# Patient Record
Sex: MALE | Race: WHITE | URBAN - METROPOLITAN AREA
[De-identification: names, ages, dates, MRNs, and addresses within clinical notes are randomized per-mention and may not be internally consistent; named-entity substitution may affect disease eponyms.]

---

## 2022-07-27 SDOH — HEALTH STABILITY: PHYSICAL HEALTH: ON AVERAGE, HOW MANY DAYS PER WEEK DO YOU ENGAGE IN MODERATE TO STRENUOUS EXERCISE (LIKE A BRISK WALK)?: 0 DAYS

## 2022-07-27 ASSESSMENT — SOCIAL DETERMINANTS OF HEALTH (SDOH)

## 2022-07-28 ENCOUNTER — OFFICE VISIT (OUTPATIENT)
Dept: FAMILY MEDICINE CLINIC | Age: 57
End: 2022-07-28
Payer: COMMERCIAL

## 2022-07-28 VITALS
DIASTOLIC BLOOD PRESSURE: 72 MMHG | SYSTOLIC BLOOD PRESSURE: 124 MMHG | WEIGHT: 249.4 LBS | RESPIRATION RATE: 16 BRPM | OXYGEN SATURATION: 95 % | HEART RATE: 83 BPM | HEIGHT: 70 IN | TEMPERATURE: 98.3 F | BODY MASS INDEX: 35.71 KG/M2

## 2022-07-28 DIAGNOSIS — Z23 NEED FOR SHINGLES VACCINE: ICD-10-CM

## 2022-07-28 DIAGNOSIS — Z71.82 EXERCISE COUNSELING: ICD-10-CM

## 2022-07-28 DIAGNOSIS — E11.42 DIABETIC POLYNEUROPATHY ASSOCIATED WITH TYPE 2 DIABETES MELLITUS (HCC): ICD-10-CM

## 2022-07-28 DIAGNOSIS — B35.1 ONYCHOMYCOSIS: ICD-10-CM

## 2022-07-28 DIAGNOSIS — M10.9 ACUTE GOUT OF LEFT HAND, UNSPECIFIED CAUSE: ICD-10-CM

## 2022-07-28 DIAGNOSIS — Z71.3 DIETARY COUNSELING: ICD-10-CM

## 2022-07-28 DIAGNOSIS — I10 PRIMARY HYPERTENSION: ICD-10-CM

## 2022-07-28 DIAGNOSIS — E78.5 HYPERLIPIDEMIA, UNSPECIFIED HYPERLIPIDEMIA TYPE: ICD-10-CM

## 2022-07-28 DIAGNOSIS — Z12.5 PROSTATE CANCER SCREENING: Primary | ICD-10-CM

## 2022-07-28 DIAGNOSIS — Z12.11 SCREEN FOR COLON CANCER: ICD-10-CM

## 2022-07-28 DIAGNOSIS — Z76.89 ESTABLISHING CARE WITH NEW DOCTOR, ENCOUNTER FOR: ICD-10-CM

## 2022-07-28 LAB — HBA1C MFR BLD: 7.7 %

## 2022-07-28 PROCEDURE — 99204 OFFICE O/P NEW MOD 45 MIN: CPT | Performed by: SURGERY

## 2022-07-28 PROCEDURE — 83036 HEMOGLOBIN GLYCOSYLATED A1C: CPT | Performed by: SURGERY

## 2022-07-28 PROCEDURE — 3051F HG A1C>EQUAL 7.0%<8.0%: CPT | Performed by: SURGERY

## 2022-07-28 RX ORDER — ALLOPURINOL 100 MG/1
100 TABLET ORAL DAILY
Qty: 7 TABLET | Refills: 0 | Status: SHIPPED | OUTPATIENT
Start: 2022-07-28

## 2022-07-28 RX ORDER — AMITRIPTYLINE HYDROCHLORIDE 10 MG/1
TABLET, FILM COATED ORAL
COMMUNITY
Start: 2022-07-14

## 2022-07-28 RX ORDER — ZOSTER VACCINE RECOMBINANT, ADJUVANTED 50 MCG/0.5
0.5 KIT INTRAMUSCULAR SEE ADMIN INSTRUCTIONS
Qty: 0.5 ML | Refills: 0 | Status: SHIPPED | OUTPATIENT
Start: 2022-07-28 | End: 2023-01-24

## 2022-07-28 SDOH — ECONOMIC STABILITY: FOOD INSECURITY: WITHIN THE PAST 12 MONTHS, YOU WORRIED THAT YOUR FOOD WOULD RUN OUT BEFORE YOU GOT MONEY TO BUY MORE.: NEVER TRUE

## 2022-07-28 SDOH — ECONOMIC STABILITY: FOOD INSECURITY: WITHIN THE PAST 12 MONTHS, THE FOOD YOU BOUGHT JUST DIDN'T LAST AND YOU DIDN'T HAVE MONEY TO GET MORE.: NEVER TRUE

## 2022-07-28 ASSESSMENT — LIFESTYLE VARIABLES
HOW MANY STANDARD DRINKS CONTAINING ALCOHOL DO YOU HAVE ON A TYPICAL DAY: 1 OR 2
HOW OFTEN DO YOU HAVE A DRINK CONTAINING ALCOHOL: 2-4 TIMES A MONTH

## 2022-07-28 ASSESSMENT — PATIENT HEALTH QUESTIONNAIRE - PHQ9
2. FEELING DOWN, DEPRESSED OR HOPELESS: 0
SUM OF ALL RESPONSES TO PHQ QUESTIONS 1-9: 0
SUM OF ALL RESPONSES TO PHQ QUESTIONS 1-9: 0
1. LITTLE INTEREST OR PLEASURE IN DOING THINGS: 0
SUM OF ALL RESPONSES TO PHQ9 QUESTIONS 1 & 2: 0
SUM OF ALL RESPONSES TO PHQ QUESTIONS 1-9: 0
SUM OF ALL RESPONSES TO PHQ QUESTIONS 1-9: 0

## 2022-07-28 ASSESSMENT — SOCIAL DETERMINANTS OF HEALTH (SDOH): HOW HARD IS IT FOR YOU TO PAY FOR THE VERY BASICS LIKE FOOD, HOUSING, MEDICAL CARE, AND HEATING?: NOT VERY HARD

## 2022-07-28 NOTE — PROGRESS NOTES
Yenny Grant. (:  1965) is a 59 y.o. male,New patient, here for evaluation of the following chief complaint(s):  Establish Care (Former Maxcine Salaam), Shoulder Pain (Right shoulder pain), and Health Maintenance (Due for Lipids, Covid Booster, Shingles, Colonoscopy, PSA, Diabetes Screen, Tdap, Hep C, HIV Screen)         ASSESSMENT/PLAN:  1. Prostate cancer screening  -     PSA Screening; Future  - Shared decision making. PEPITO is less effective than the PSA blood test in finding prostate cancer, but it can sometimes find cancers in men with normal PSA levels. Patient is understanding of this and risks versus benefits. PEPITO deferred. 2. Acute gout of left hand, unspecified cause  -     allopurinol (ZYLOPRIM) 100 MG tablet; Take 1 tablet by mouth in the morning., Disp-7 tablet, R-0Normal  -     Uric Acid; Future  - Back on allopurinol  - Uric acid to assess efficacy   3. Hyperlipidemia, unspecified hyperlipidemia type  -     Lipid, Fasting; Future  -Aggressive lifestyle modification  4. Primary hypertension  -     CBC with Auto Differential; Future  -     Comprehensive Metabolic Panel; Future  - Normotensive no changes. Stable. Needs to follow a sodium restricted diet  5. Need for shingles vaccine  -     zoster recombinant adjuvanted vaccine [facility]) 50 MCG/0.5ML SUSR injection; Inject 0.5 mLs into the muscle See Admin Instructions 1 dose now and repeat in 2-6 months, Disp-0.5 mL, R-0Normal  6. Screen for colon cancer  -     Fecal DNA Colorectal cancer screening (Cologuard)  - Shared decision making  7. Diabetic polyneuropathy associated with type 2 diabetes mellitus ([facility] [address]  -     POCT glycosylated hemoglobin (Hb A1C)  -     Amanda Soria DPM, Podiatry, [address]  8. Onychomycosis  -     ASHLEY Viramontes [address] [place] [address], Podiatry,  [facility] on dangers of oral antifungals  9. Dietary counseling  Counseled the patient on diet, continue to make positive choices.     It is important to focus on meeting food group needs with nutrient dense foods and stay within caloric limits. Nutrient dense foods will provide you with vitamins, minerals, and other health promoting nutrients. You should avoid added sugars, saturated fats, hydrogenated oils, and limit sodium intake (this isn't just table salt. .. turn the package over, read the label, stay under 2000 mg or 2g of total sodium daily). Track your calories, this will help you get an understanding of what a proper portion size is. Every day you should eat these:  -Veggies of all types  -Fruits  -Grains (strive for at least half of these to be whole-grain)  -Lean protein (poultry, fish, legumes, nuts)    Stick to the plate diet.    -56% of your dinner plate should be leafy green vegetables, dark green, red and orange vegetables. Do not use high-calorie dressing is a ranch or dressings that are filled with added sugars. 25% of your dinner plate should be whole grains. The remaining 25% of your dinner plate should be a lean protein. Limit your red meat intake to once per week and no more than 4 ounces in any serving.  -No need for second helpings. Limit or avoid these foods:  -Added sugars (less than 10% of your total calories in any given day should be from sugars)  -Saturated fats and hydrogenated oils (less than 10% of your total calories in any given day should be from these)  -Sodium (less than 2000 mg/day)  -Alcoholic beverages (even in moderation, alcohol can cause long-term health issues involving hypertension, electrolyte abnormalities, liver disease and even cancers of the mouth and throat)    Stay hydrated. Unless instructed otherwise by your physician, you should strive to drink at least eight 8 ounce glasses of water daily, some of these being fortified with electrolytes (such as a Pedialyte, or low calorie sports drink). Again, avoid added sugars.     10. Exercise counseling  Counseled the patient on importance of cardiovascular and resistance training. Make every attempt to engage in a level of activity, sustained for at least 30 minutes, where you saturate your undergarments with sweat. This should be done, at a minimum, three times per week. 6. Establishing care with new doctor, encounter for  All personal, family, social, surgical, medical history is reviewed and updated with patient. Allergies, medications updated. List of specialists follows with updated. DM/HM updated. Return in about 3 months (around 10/28/2022) for diabetic checkup . Subjective   SUBJECTIVE/OBJECTIVE:  HPI:    Dr. Howard PITTMAN [place], [place])    Gout:  -last flare about one year prior  -not taking allopurinol (was reportedly told this treats flares not prevents them)  -needs uric acid level  -needs purine restricted diet    DM2:  -jardiance  -glipizide  -metformin  -ozempic    -fasting BG is 160s    HTN:  -atenolol  -benazepril  -hctz  -norvasc      PSYCH:  -PHQ-9 Total Score: 0 (7/28/2022  8:46 AM)        Preventative:  Health Maintenance   Topic Date Due    Pneumococcal 0-64 years Vaccine (1 - PCV) Never done    Lipids  Never done    DTaP/Tdap/Td vaccine (1 - Tdap) Never done    Prostate Specific Antigen (PSA) Screening or Monitoring  Never done    Colorectal Cancer Screen  Never done    Shingles vaccine (1 of 2) Never done    COVID-19 Vaccine (4 - Booster for Pfizer series) 02/16/2022    Flu vaccine (1) 09/01/2022    A1C test (Diabetic or Prediabetic)  10/28/2022    Depression Screen  07/28/2023    Hepatitis A vaccine  Aged Out    Hepatitis B vaccine  Aged Out    Hib vaccine  Aged Out    Meningococcal (ACWY) vaccine  Aged Out    Hepatitis C screen  Discontinued    HIV screen  Discontinued           ROS:    Denies 10pt ROS other than noted in HPI.          Objective       PHYSICAL EXAM:    /72   Pulse 83   Temp 98.3 °F (36.8 °C) (Temporal)   Resp 16   Ht 5' 10\" (1.778 m)   Wt 249 lb 6.4 oz (113.1 kg)   SpO2 95%   BMI 35.79

## 2022-08-18 ENCOUNTER — HOSPITAL ENCOUNTER (OUTPATIENT)
Age: 57
Discharge: HOME | End: 2022-08-18
Payer: COMMERCIAL

## 2022-08-18 DIAGNOSIS — Z12.5 PROSTATE CANCER SCREENING: ICD-10-CM

## 2022-08-18 DIAGNOSIS — I10 PRIMARY HYPERTENSION: ICD-10-CM

## 2022-08-18 DIAGNOSIS — M10.9 ACUTE GOUT OF LEFT HAND, UNSPECIFIED CAUSE: ICD-10-CM

## 2022-08-18 DIAGNOSIS — E78.5 HYPERLIPIDEMIA, UNSPECIFIED HYPERLIPIDEMIA TYPE: ICD-10-CM

## 2022-08-18 LAB
ALBUMIN SERPL-MCNC: 4.6 G/DL (ref 3.5–5.2)
ALP BLD-CCNC: 94 U/L (ref 40–129)
ALT SERPL-CCNC: 41 U/L (ref 0–40)
ANION GAP SERPL CALCULATED.3IONS-SCNC: 13 MMOL/L (ref 7–16)
AST SERPL-CCNC: 27 U/L (ref 0–39)
BASOPHILS ABSOLUTE: 0.04 E9/L (ref 0–0.2)
BASOPHILS RELATIVE PERCENT: 0.5 % (ref 0–2)
BILIRUB SERPL-MCNC: 0.5 MG/DL (ref 0–1.2)
BUN BLDV-MCNC: 22 MG/DL (ref 6–20)
CALCIUM SERPL-MCNC: 9.9 MG/DL (ref 8.6–10.2)
CHLORIDE BLD-SCNC: 101 MMOL/L (ref 98–107)
CHOLESTEROL, FASTING: 136 MG/DL (ref 0–199)
CO2: 25 MMOL/L (ref 22–29)
CREAT SERPL-MCNC: 1.2 MG/DL (ref 0.7–1.2)
EOSINOPHILS ABSOLUTE: 0.25 E9/L (ref 0.05–0.5)
EOSINOPHILS RELATIVE PERCENT: 3.1 % (ref 0–6)
GFR AFRICAN AMERICAN: >60
GFR NON-AFRICAN AMERICAN: >60 ML/MIN/1.73
GLUCOSE BLD-MCNC: 204 MG/DL (ref 74–99)
HCT VFR BLD CALC: 45.3 % (ref 37–54)
HDLC SERPL-MCNC: 33 MG/DL
HEMOGLOBIN: 16.2 G/DL (ref 12.5–16.5)
IMMATURE GRANULOCYTES #: 0.03 E9/L
IMMATURE GRANULOCYTES %: 0.4 % (ref 0–5)
LDL CHOLESTEROL CALCULATED: 35 MG/DL (ref 0–99)
LYMPHOCYTES ABSOLUTE: 2.91 E9/L (ref 1.5–4)
LYMPHOCYTES RELATIVE PERCENT: 35.7 % (ref 20–42)
MCH RBC QN AUTO: 30.6 PG (ref 26–35)
MCHC RBC AUTO-ENTMCNC: 35.8 % (ref 32–34.5)
MCV RBC AUTO: 85.6 FL (ref 80–99.9)
MONOCYTES ABSOLUTE: 0.74 E9/L (ref 0.1–0.95)
MONOCYTES RELATIVE PERCENT: 9.1 % (ref 2–12)
NEUTROPHILS ABSOLUTE: 4.19 E9/L (ref 1.8–7.3)
NEUTROPHILS RELATIVE PERCENT: 51.2 % (ref 43–80)
PDW BLD-RTO: 12 FL (ref 11.5–15)
PLATELET # BLD: 220 E9/L (ref 130–450)
PMV BLD AUTO: 10.3 FL (ref 7–12)
POTASSIUM SERPL-SCNC: 4.2 MMOL/L (ref 3.5–5)
PROSTATE SPECIFIC ANTIGEN: 0.3 NG/ML (ref 0–4)
RBC # BLD: 5.29 E12/L (ref 3.8–5.8)
SODIUM BLD-SCNC: 139 MMOL/L (ref 132–146)
TOTAL PROTEIN: 8.1 G/DL (ref 6.4–8.3)
TRIGLYCERIDE, FASTING: 341 MG/DL (ref 0–149)
URIC ACID, SERUM: 6.7 MG/DL (ref 3.4–7)
VLDLC SERPL CALC-MCNC: 68 MG/DL
WBC # BLD: 8.2 E9/L (ref 4.5–11.5)

## 2022-08-18 PROCEDURE — 80053 COMPREHEN METABOLIC PANEL: CPT

## 2022-08-18 PROCEDURE — 36415 COLL VENOUS BLD VENIPUNCTURE: CPT

## 2022-08-18 PROCEDURE — 80061 LIPID PANEL: CPT

## 2022-08-18 PROCEDURE — 85025 COMPLETE CBC W/AUTO DIFF WBC: CPT

## 2022-08-18 PROCEDURE — G0103 PSA SCREENING: HCPCS

## 2022-08-18 PROCEDURE — 84550 ASSAY OF BLOOD/URIC ACID: CPT

## 2022-08-29 ENCOUNTER — PATIENT MESSAGE (OUTPATIENT)
Dept: FAMILY MEDICINE CLINIC | Age: 57
End: 2022-08-29

## 2022-08-29 DIAGNOSIS — E78.1 HYPERTRIGLYCERIDEMIA: Primary | ICD-10-CM

## 2022-08-29 RX ORDER — ICOSAPENT ETHYL 1000 MG/1
2 CAPSULE ORAL 2 TIMES DAILY
Qty: 60 CAPSULE | Refills: 3 | Status: SHIPPED
Start: 2022-08-29 | End: 2022-09-07 | Stop reason: SDUPTHER

## 2022-08-30 RX ORDER — OMEPRAZOLE 20 MG/1
CAPSULE, DELAYED RELEASE ORAL
Qty: 30 CAPSULE | Refills: 5 | Status: SHIPPED | OUTPATIENT
Start: 2022-08-30

## 2022-08-30 NOTE — TELEPHONE ENCOUNTER
From: Linda Kelley. To: Dr. Rbo Villeda: 8/29/2022 8:57 PM EDT  Subject: Medication    Can I get a prescription for omeprazole. My specialist says I should get it through you now.

## 2022-09-02 ENCOUNTER — TELEPHONE (OUTPATIENT)
Dept: FAMILY MEDICINE CLINIC | Age: 57
End: 2022-09-02

## 2022-09-02 NOTE — TELEPHONE ENCOUNTER
Phone call from pharmacy. They received a script on 8/29 for vascepa 2 capsules twice daily but quantity was #60. They want to know if they can get a new script for a 30 day supply instead of 15 days.

## 2022-09-06 DIAGNOSIS — E78.1 HYPERTRIGLYCERIDEMIA: ICD-10-CM

## 2022-09-06 NOTE — TELEPHONE ENCOUNTER
Per  \"Please rommel up thanks    120 caps    Thanks\"    Teed up prescription, pended for 's approval.

## 2022-09-07 ENCOUNTER — OFFICE VISIT (OUTPATIENT)
Dept: PODIATRY | Age: 57
End: 2022-09-07
Payer: COMMERCIAL

## 2022-09-07 VITALS — HEIGHT: 70 IN | WEIGHT: 250 LBS | BODY MASS INDEX: 35.79 KG/M2

## 2022-09-07 DIAGNOSIS — G60.9 IDIOPATHIC PERIPHERAL NEUROPATHY: ICD-10-CM

## 2022-09-07 DIAGNOSIS — B35.1 ONYCHOMYCOSIS: Primary | ICD-10-CM

## 2022-09-07 PROCEDURE — G8417 CALC BMI ABV UP PARAM F/U: HCPCS | Performed by: PODIATRIST

## 2022-09-07 PROCEDURE — G8427 DOCREV CUR MEDS BY ELIG CLIN: HCPCS | Performed by: PODIATRIST

## 2022-09-07 PROCEDURE — 99203 OFFICE O/P NEW LOW 30 MIN: CPT | Performed by: PODIATRIST

## 2022-09-07 RX ORDER — LEUCOVORIN/PYRIDOX/MECOBALAMIN 4-50-2 MG
4-50 TABLET ORAL 2 TIMES DAILY
Qty: 90 TABLET | Refills: 2 | Status: SHIPPED | OUTPATIENT
Start: 2022-09-07

## 2022-09-07 RX ORDER — ICOSAPENT ETHYL 1000 MG/1
2 CAPSULE ORAL 2 TIMES DAILY
Qty: 60 CAPSULE | Refills: 120 | Status: SHIPPED | OUTPATIENT
Start: 2022-09-07 | End: 2022-10-13 | Stop reason: SDUPTHER

## 2022-09-07 NOTE — PROGRESS NOTES
22     Joserialba Pavon. : 1965 Sex: male   Age: 59 y.o. Patient was referred by: William Curry DO  Patient's PCP/Provider is:  William Curry DO    Subjective:    Patient seen today for diabetic foot evaluation and management. Chief Complaint   Patient presents with    Nail Problem     Nail care       HPI: Patient stated he has had chronic neuropathy issues to both lower extremities for several years. Patient also having issues with nail dystrophy to his right great toe which she would like to discuss treatment options. Patient denies any additional issues at this time. He does try to wear appropriate shoe gear on a daily basis with ambulatory activities. ROS:  Const: Positives and pertinent negatives as per HPI. Musculo: Denies symptoms other than stated above. Neuro: Denies symptoms other than stated above. Skin: Denies symptoms other than stated above. Current Medications:    Current Outpatient Medications:     ciclopirox (PENLAC) 8 % solution, Apply topically daily to affected nails. , Disp: 6 mL, Rfl: 2    Folinic Acid-Vit B6-Vit B12 (FOLINIC-PLUS) 4-50-2 MG TABS, Take 4-50 mg by mouth 2 times daily, Disp: 90 tablet, Rfl: 2    omeprazole (PRILOSEC) 20 MG delayed release capsule, TAKE 1 CAPSULE BY MOUTH EVERY MORNING BEFORE BREAKFAST, Disp: 30 capsule, Rfl: 5    Icosapent Ethyl (VASCEPA) 1 g CAPS capsule, Take 2 capsules by mouth 2 times daily, Disp: 60 capsule, Rfl: 3    amitriptyline (ELAVIL) 10 MG tablet, , Disp: , Rfl:     empagliflozin (JARDIANCE) 10 MG tablet, Take 1 tablet by mouth in the morning., Disp: 30 tablet, Rfl: 3    allopurinol (ZYLOPRIM) 100 MG tablet, Take 1 tablet by mouth in the morning., Disp: 7 tablet, Rfl: 0    zoster recombinant adjuvanted vaccine (SHINGRIX) 50 MCG/0.5ML SUSR injection, Inject 0.5 mLs into the muscle See Admin Instructions 1 dose now and repeat in 2-6 months, Disp: 0.5 mL, Rfl: 0    amLODIPine (NORVASC) 10 MG tablet, TK 1 T PO QD, Disp: , Rfl:     sildenafil (VIAGRA) 100 MG tablet, 1 tab(s), Disp: , Rfl:     blood glucose test strips (ASCENSIA AUTODISC VI;ONE TOUCH ULTRA TEST VI) strip, 1, Disp: , Rfl:     Blood Glucose Monitoring Suppl (TRUE METRIX METER) [place], as directed, Disp: , Rfl:     gabapentin (NEURONTIN) 300 MG capsule, Take 300 mg by mouth in the morning and 300 mg at noon and 300 mg before bedtime.  Patient takes 600 mg am, 600 mg at noon, 900 mg at night., Disp: , Rfl:     aspirin 81 MG EC tablet, Take 81 mg by mouth daily, Disp: , Rfl:     vitamin D (ERGOCALCIFEROL) 1.25 MG (56882 UT) CAPS capsule, Take 50,000 Units by mouth once a week Thursday, Disp: , Rfl:     metFORMIN (GLUCOPHAGE) 1000 MG tablet, Take 1,000 mg by mouth 2 times daily (with meals), Disp: , Rfl:     atorvastatin (LIPITOR) 10 MG tablet, Take 10 mg by mouth daily, Disp: , Rfl:     glipiZIDE (GLUCOTROL) 10 MG tablet, Take 10 mg by mouth daily, Disp: , Rfl:     hydroCHLOROthiazide (HYDRODIURIL) 25 MG tablet, Take 25 mg by mouth daily, Disp: , Rfl:     benazepril (LOTENSIN) 40 MG tablet, Take 40 mg by mouth daily, Disp: , Rfl:     atenolol (TENORMIN) 100 MG tablet, Take 100 mg by mouth daily, Disp: , Rfl:     Semaglutide (OZEMPIC, 1 MG/DOSE, SC), Inject into the skin once a week, Disp: , Rfl:     vitamin B-12 (CYANOCOBALAMIN) 1000 MCG tablet, Take 1,000 mcg by mouth daily, Disp: , Rfl:     Multiple Vitamin (MULTIVITAMIN ADULT PO), Take by mouth daily, Disp: , Rfl:     Allergies:  No Known Allergies    Vitals:    09/07/22 1523   Weight: 250 lb (113.4 kg)   Height: 5' 10\" (1.778 m)        Past Medical History:   Diagnosis Date    DM2 (diabetes mellitus, type 2) (HCC)     High triglycerides     HTN (hypertension)     Neuropathy     feet    Sleep apnea     Vitamin D deficiency      Family History   Problem Relation Age of Onset    Crohn's Disease Mother     Pancreatic Cancer Father         dx age 77    Colon Cancer Neg Hx     Stomach Cancer Neg Hx     Liver Cancer Neg Hx     Esophageal Cancer Neg Hx     Ulcerative Colitis Neg Hx      Past Surgical History:   Procedure Laterality Date    CARPAL TUNNEL RELEASE      b/l    LUNG BIOPSY      ROTATOR CUFF REPAIR  2020    right    UPPER GASTROINTESTINAL ENDOSCOPY  08/12/2021    Dr. Alivia Miguel     Social History     Tobacco Use    Smoking status: Never    Smokeless tobacco: Never   Vaping Use    Vaping Use: Never used   Substance Use Topics    Alcohol use: Yes     Comment: occ    Drug use: Not Currently     Types: Marijuana Delona Members), Cocaine           Diagnostic studies:    No results found. Procedures:    None    Exam:  VASCULAR: Pedal pulses palpable bilateral foot. Capillary fill time less than 3 seconds digits 1 through 5 bilateral foot. Hair growth present bilateral lower extremities  NEUROLOGICAL: Epicritic sensations diminished to both lower extremities. Paresthesias noted to both lower extremities. DERMATOLOGICAL: Edema noted to both lower extremities with mild varicosities present. MUSCULOSKELETAL: Adequate range of motion ankle, subtalar joint, and MTPJ regions bilaterally. Plan Per Assessment  Suresh Iniguez was seen today for nail problem. Diagnoses and all orders for this visit:    Onychomycosis  -     ciclopirox (PENLAC) 8 % solution; Apply topically daily to affected nails. Idiopathic peripheral neuropathy  -     Folinic Acid-Vit B6-Vit B12 (FOLINIC-PLUS) 4-50-2 MG TABS; Take 4-50 mg by mouth 2 times daily        New patient consultation and management  We did discuss multiple treatment options in regards to issues today. Prescriptions given for topical Penlac solution as well as oral folinic plus to be utilized as instructed. We did discuss the importance of wearing good supportive shoe gear with every day activities. Patient will be followed up at a later date for continued evaluation and care.       Seen By:    Chantal Edwards DPM    Electronically signed by Chantal Edwards DPM on 9/7/2022 at 3:56 PM      This note was created using voice recognition software. The note was reviewed however may contain grammatical errors.

## 2022-09-07 NOTE — LETTER
[address] [facility] Juan Lopez  Phone: [phone]  Fax: Chapin Guzman, [address] [place] [address] Drive, [address] Evelyne Hurd.  [phone]   1965 9/7/2022      Dear Tello Clemens,    I would like to thank you for the kind referral of Pamela Saldivar. Georges Mcelroy He presented to the office today for evaluation regarding chronic neuropathy issues to both lower extremities. Patient also having issues in regards to chronic nail dystrophy right great toe. We did discuss treatment options in regards to both issues. Prescription's given for oral multivitamin as well as topical nail lacquer to be utilized as instructed. We will have continued follow-up until issues are improved. If you should have any questions concerning his visit today, please do not hesitate to contact me.     Sincerely,    Anton Monahan DPM

## 2022-09-07 NOTE — PROGRESS NOTES
Patient is in today for routine nail care. Patient also believes he has nail fungus.  Pcp is Wellington Vo DO  Last ov 7/28/22

## 2022-09-24 LAB — NONINV COLON CA DNA+OCC BLD SCRN STL QL: POSITIVE

## 2022-09-28 ENCOUNTER — TELEPHONE (OUTPATIENT)
Dept: FAMILY MEDICINE CLINIC | Age: 57
End: 2022-09-28

## 2022-09-28 DIAGNOSIS — R19.5 POSITIVE COLORECTAL CANCER SCREENING USING COLOGUARD TEST: Primary | ICD-10-CM

## 2022-10-05 ENCOUNTER — OFFICE VISIT (OUTPATIENT)
Dept: PODIATRY | Age: 57
End: 2022-10-05
Payer: COMMERCIAL

## 2022-10-05 VITALS — BODY MASS INDEX: 35.79 KG/M2 | HEIGHT: 70 IN | WEIGHT: 250 LBS

## 2022-10-05 DIAGNOSIS — B35.1 ONYCHOMYCOSIS: Primary | ICD-10-CM

## 2022-10-05 DIAGNOSIS — G60.9 IDIOPATHIC PERIPHERAL NEUROPATHY: ICD-10-CM

## 2022-10-05 PROCEDURE — G8484 FLU IMMUNIZE NO ADMIN: HCPCS | Performed by: PODIATRIST

## 2022-10-05 PROCEDURE — 3017F COLORECTAL CA SCREEN DOC REV: CPT | Performed by: PODIATRIST

## 2022-10-05 PROCEDURE — G8427 DOCREV CUR MEDS BY ELIG CLIN: HCPCS | Performed by: PODIATRIST

## 2022-10-05 PROCEDURE — G8417 CALC BMI ABV UP PARAM F/U: HCPCS | Performed by: PODIATRIST

## 2022-10-05 PROCEDURE — 1036F TOBACCO NON-USER: CPT | Performed by: PODIATRIST

## 2022-10-05 PROCEDURE — 99213 OFFICE O/P EST LOW 20 MIN: CPT | Performed by: PODIATRIST

## 2022-10-05 NOTE — PROGRESS NOTES
10/5/22     Jemima Grossman. : 1965   Sex: male    Age: 59 y.o. Patient's PCP/Provider is:  Tia Love DO    Subjective:  Patient is seen today for follow-up regarding continued care regarding nail fungal issues right foot. Patient is also taking the oral multivitamin in regards to the neuropathy issues as well. No other abnormalities noted at this time. Chief Complaint   Patient presents with    Nail Problem     Nail fungus        ROS:  Const: Positives and pertinent negatives as per HPI. Musculo: Denies symptoms other than stated above. Neuro: Denies symptoms other than stated above. Skin: Denies symptoms other than stated above. Current Medications:    Current Outpatient Medications:     Icosapent Ethyl (VASCEPA) 1 g CAPS capsule, Take 2 capsules by mouth 2 times daily, Disp: 60 capsule, Rfl: 120    ciclopirox (PENLAC) 8 % solution, Apply topically daily to affected nails. , Disp: 6 mL, Rfl: 2    Folinic Acid-Vit B6-Vit B12 (FOLINIC-PLUS) 4-50-2 MG TABS, Take 4-50 mg by mouth 2 times daily, Disp: 90 tablet, Rfl: 2    omeprazole (PRILOSEC) 20 MG delayed release capsule, TAKE 1 CAPSULE BY MOUTH EVERY MORNING BEFORE BREAKFAST, Disp: 30 capsule, Rfl: 5    amitriptyline (ELAVIL) 10 MG tablet, , Disp: , Rfl:     empagliflozin (JARDIANCE) 10 MG tablet, Take 1 tablet by mouth in the morning., Disp: 30 tablet, Rfl: 3    allopurinol (ZYLOPRIM) 100 MG tablet, Take 1 tablet by mouth in the morning., Disp: 7 tablet, Rfl: 0    zoster recombinant adjuvanted vaccine (SHINGRIX) 50 MCG/0.5ML SUSR injection, Inject 0.5 mLs into the muscle See Admin Instructions 1 dose now and repeat in 2-6 months, Disp: 0.5 mL, Rfl: 0    amLODIPine (NORVASC) 10 MG tablet, TK 1 T PO QD, Disp: , Rfl:     sildenafil (VIAGRA) 100 MG tablet, 1 tab(s), Disp: , Rfl:     blood glucose test strips (ASCENSIA AUTODISC VI;ONE TOUCH ULTRA TEST VI) strip, 1, Disp: , Rfl:     Blood Glucose Monitoring Suppl (TRUE METRIX METER) [place], as directed, Disp: , Rfl:     gabapentin (NEURONTIN) 300 MG capsule, Take 300 mg by mouth in the morning and 300 mg at noon and 300 mg before bedtime. Patient takes 600 mg am, 600 mg at noon, 900 mg at night., Disp: , Rfl:     aspirin 81 MG EC tablet, Take 81 mg by mouth daily, Disp: , Rfl:     vitamin D (ERGOCALCIFEROL) 1.25 MG (50277 UT) CAPS capsule, Take 50,000 Units by mouth once a week Thursday, Disp: , Rfl:     metFORMIN (GLUCOPHAGE) 1000 MG tablet, Take 1,000 mg by mouth 2 times daily (with meals), Disp: , Rfl:     atorvastatin (LIPITOR) 10 MG tablet, Take 10 mg by mouth daily, Disp: , Rfl:     glipiZIDE (GLUCOTROL) 10 MG tablet, Take 10 mg by mouth daily, Disp: , Rfl:     hydroCHLOROthiazide (HYDRODIURIL) 25 MG tablet, Take 25 mg by mouth daily, Disp: , Rfl:     benazepril (LOTENSIN) 40 MG tablet, Take 40 mg by mouth daily, Disp: , Rfl:     atenolol (TENORMIN) 100 MG tablet, Take 100 mg by mouth daily, Disp: , Rfl:     Semaglutide (OZEMPIC, 1 MG/DOSE, SC), Inject into the skin once a week, Disp: , Rfl:     vitamin B-12 (CYANOCOBALAMIN) 1000 MCG tablet, Take 1,000 mcg by mouth daily, Disp: , Rfl:     Multiple Vitamin (MULTIVITAMIN ADULT PO), Take by mouth daily, Disp: , Rfl:     Allergies:  No Known Allergies    Vitals:    10/05/22 0814   Weight: 250 lb (113.4 kg)   Height: 5' 10\" (1.778 m)       Exam:  Neurovascular status unchanged. Mycotic nail issues improved right great toe. No maceration webspaces noted right foot. Paresthesias are improved to both lower extremities with current treatment and use of compression stockings. Diagnostic Studies:     No results found. Procedures:    None    Plan Per Assessment  Riddhi Harvey was seen today for nail problem.     Diagnoses and all orders for this visit:    Onychomycosis    Idiopathic peripheral neuropathy      Evaluation and management  Did advise patient continued use of the topical nail lacquer to the affected digital nails for continued treatment. Patient is to continue with his compression stockings and the oral multivitamin to help with the underlying neuropathy issues. Patient will be followed up at a later date for continued evaluation and management. Seen By:    Maria Victoria Chance DPM    Electronically signed by Maria Victoria Chance DPM on 10/5/2022 at 8:36 AM    This note was created using voice recognition software. The note was reviewed however may contain grammatical errors.

## 2022-10-12 ENCOUNTER — TELEPHONE (OUTPATIENT)
Dept: FAMILY MEDICINE CLINIC | Age: 57
End: 2022-10-12

## 2022-10-12 RX ORDER — ATENOLOL 100 MG/1
100 TABLET ORAL DAILY
Qty: 90 TABLET | Refills: 3 | Status: SHIPPED | OUTPATIENT
Start: 2022-10-12

## 2022-10-12 RX ORDER — HYDROCHLOROTHIAZIDE 25 MG/1
25 TABLET ORAL DAILY
Qty: 90 TABLET | Refills: 3 | Status: SHIPPED | OUTPATIENT
Start: 2022-10-12

## 2022-10-12 RX ORDER — AMLODIPINE BESYLATE 10 MG/1
TABLET ORAL
Qty: 90 TABLET | Refills: 3 | Status: SHIPPED | OUTPATIENT
Start: 2022-10-12

## 2022-10-12 RX ORDER — BENAZEPRIL HYDROCHLORIDE 40 MG/1
40 TABLET, FILM COATED ORAL DAILY
Qty: 90 TABLET | Refills: 3 | Status: SHIPPED | OUTPATIENT
Start: 2022-10-12

## 2022-10-12 NOTE — TELEPHONE ENCOUNTER
[facility] called stating patient's insurance is requiring a 90 day supply of Vascepa and asked if Dr. Zully De Santiago can update RX and resubmit for quantity #360.     Last seen 7/28/2022  Next appt 11/10/2022  [facility]

## 2022-10-12 NOTE — TELEPHONE ENCOUNTER
Patient requesting medication refill.     Last Appointment   Visit date not found  Next Appointment  11/10/2022

## 2022-10-13 DIAGNOSIS — E78.1 HYPERTRIGLYCERIDEMIA: ICD-10-CM

## 2022-10-13 RX ORDER — ICOSAPENT ETHYL 1000 MG/1
2 CAPSULE ORAL 2 TIMES DAILY
Qty: 360 CAPSULE | Refills: 2 | Status: SHIPPED | OUTPATIENT
Start: 2022-10-13

## 2022-10-14 ENCOUNTER — TELEPHONE (OUTPATIENT)
Dept: FAMILY MEDICINE CLINIC | Age: 57
End: 2022-10-14

## 2022-10-14 DIAGNOSIS — U07.1 COVID-19: Primary | ICD-10-CM

## 2022-10-14 RX ORDER — NIRMATRELVIR AND RITONAVIR 300-100 MG
KIT ORAL
Qty: 30 TABLET | Refills: 0 | Status: SHIPPED | OUTPATIENT
Start: 2022-10-14 | End: 2022-10-19

## 2022-10-14 NOTE — TELEPHONE ENCOUNTER
I was speaking w/patient regarding an RX that was sent in and patient informed he tested positive for COVID yesterday. Patient stated he's a little stuffed up, coughing and having some dizziness. I advised patient to stay hydrated and get plenty of rest.  Informed patient that I would let Dr. Mary Kate Hughes know.     Last seen 7/28/2022  Next appt 11/10/2022  [facility]

## 2022-10-24 ENCOUNTER — PATIENT MESSAGE (OUTPATIENT)
Dept: FAMILY MEDICINE CLINIC | Age: 57
End: 2022-10-24

## 2022-10-25 NOTE — TELEPHONE ENCOUNTER
From: Brian Multani. To: Dr. Lennox Michelle: 10/24/2022 5:54 PM EDT  Subject: New prescriptions for current medications    Could I please get a new prescription for Glipizide 10mg tablets. 1 tablet a day. Vitamin D2 50,000IU 1 time a week. Can I get these for 90 day supplies for insurance purposes. Thank you.

## 2022-10-26 RX ORDER — ERGOCALCIFEROL 1.25 MG/1
50000 CAPSULE ORAL WEEKLY
Qty: 12 CAPSULE | Refills: 0 | Status: SHIPPED | OUTPATIENT
Start: 2022-10-26

## 2022-10-26 RX ORDER — GLIPIZIDE 10 MG/1
10 TABLET ORAL DAILY
Qty: 90 TABLET | Refills: 0 | Status: SHIPPED | OUTPATIENT
Start: 2022-10-26

## 2022-11-10 ENCOUNTER — OFFICE VISIT (OUTPATIENT)
Dept: FAMILY MEDICINE CLINIC | Age: 57
End: 2022-11-10
Payer: COMMERCIAL

## 2022-11-10 VITALS
RESPIRATION RATE: 16 BRPM | BODY MASS INDEX: 34.6 KG/M2 | SYSTOLIC BLOOD PRESSURE: 126 MMHG | TEMPERATURE: 97.7 F | DIASTOLIC BLOOD PRESSURE: 74 MMHG | HEIGHT: 70 IN | HEART RATE: 99 BPM | OXYGEN SATURATION: 98 % | WEIGHT: 241.7 LBS

## 2022-11-10 DIAGNOSIS — I10 PRIMARY HYPERTENSION: ICD-10-CM

## 2022-11-10 DIAGNOSIS — M10.9 ACUTE GOUT OF LEFT HAND, UNSPECIFIED CAUSE: ICD-10-CM

## 2022-11-10 DIAGNOSIS — E11.42 DIABETIC POLYNEUROPATHY ASSOCIATED WITH TYPE 2 DIABETES MELLITUS (HCC): Primary | ICD-10-CM

## 2022-11-10 LAB — HBA1C MFR BLD: 8.1 %

## 2022-11-10 PROCEDURE — 1036F TOBACCO NON-USER: CPT | Performed by: SURGERY

## 2022-11-10 PROCEDURE — 3078F DIAST BP <80 MM HG: CPT | Performed by: SURGERY

## 2022-11-10 PROCEDURE — 99214 OFFICE O/P EST MOD 30 MIN: CPT | Performed by: SURGERY

## 2022-11-10 PROCEDURE — G8427 DOCREV CUR MEDS BY ELIG CLIN: HCPCS | Performed by: SURGERY

## 2022-11-10 PROCEDURE — 3074F SYST BP LT 130 MM HG: CPT | Performed by: SURGERY

## 2022-11-10 PROCEDURE — 90677 PCV20 VACCINE IM: CPT | Performed by: SURGERY

## 2022-11-10 PROCEDURE — 3052F HG A1C>EQUAL 8.0%<EQUAL 9.0%: CPT | Performed by: SURGERY

## 2022-11-10 PROCEDURE — 90472 IMMUNIZATION ADMIN EACH ADD: CPT | Performed by: SURGERY

## 2022-11-10 PROCEDURE — 3017F COLORECTAL CA SCREEN DOC REV: CPT | Performed by: SURGERY

## 2022-11-10 PROCEDURE — 2022F DILAT RTA XM EVC RTNOPTHY: CPT | Performed by: SURGERY

## 2022-11-10 PROCEDURE — 90471 IMMUNIZATION ADMIN: CPT | Performed by: SURGERY

## 2022-11-10 PROCEDURE — 83036 HEMOGLOBIN GLYCOSYLATED A1C: CPT | Performed by: SURGERY

## 2022-11-10 PROCEDURE — G8417 CALC BMI ABV UP PARAM F/U: HCPCS | Performed by: SURGERY

## 2022-11-10 PROCEDURE — 90674 CCIIV4 VAC NO PRSV 0.5 ML IM: CPT | Performed by: SURGERY

## 2022-11-10 PROCEDURE — G8482 FLU IMMUNIZE ORDER/ADMIN: HCPCS | Performed by: SURGERY

## 2022-11-10 RX ORDER — GLUCOSAMINE HCL/CHONDROITIN SU 500-400 MG
CAPSULE ORAL
Qty: 100 STRIP | Refills: 0 | Status: SHIPPED
Start: 2022-11-10 | End: 2022-12-01

## 2022-11-10 RX ORDER — POLYETHYLENE GLYCOL 3350 17 G/17G
POWDER, FOR SOLUTION ORAL
COMMUNITY
Start: 2022-11-07

## 2022-11-10 RX ORDER — GABAPENTIN 600 MG/1
TABLET ORAL
COMMUNITY
Start: 2022-10-26

## 2022-11-10 RX ORDER — GABAPENTIN 300 MG/1
300 CAPSULE ORAL 3 TIMES DAILY
Qty: 90 CAPSULE | Refills: 1 | Status: SHIPPED | OUTPATIENT
Start: 2022-11-10 | End: 2023-02-08

## 2022-11-10 RX ORDER — LANCETS 30 GAUGE
1 EACH MISCELLANEOUS 2 TIMES DAILY
Qty: 100 EACH | Refills: 5 | Status: SHIPPED | OUTPATIENT
Start: 2022-11-10

## 2022-11-10 RX ORDER — ALLOPURINOL 100 MG/1
100 TABLET ORAL DAILY
Qty: 90 TABLET | Refills: 1 | Status: SHIPPED | OUTPATIENT
Start: 2022-11-10

## 2022-11-10 RX ORDER — BLOOD-GLUCOSE METER
1 KIT MISCELLANEOUS DAILY
Qty: 1 KIT | Refills: 0 | Status: SHIPPED | OUTPATIENT
Start: 2022-11-10

## 2022-11-10 NOTE — PROGRESS NOTES
[address] (:  1965) is a 59 y.o. male,Established patient, here for evaluation of the following chief complaint(s):  Diabetes (3 MONTH FOLLOW UP) and Health Maintenance (PNEUMOCOCCAL, DIABETIC EYE, TDAP, SHINGLES, COVID BOOSTER)         ASSESSMENT/PLAN:  1. Diabetic polyneuropathy associated with type 2 diabetes mellitus (HCC)  -     POCT glycosylated hemoglobin (Hb A1C)  -     glucose monitoring (FREESTYLE FREEDOM) kit; DAILY Starting Thu 11/10/2022, Disp-1 kit, R-0, Normal  -     blood glucose monitor strips; Test bid times a day & as needed for symptoms of irregular blood glucose. Dispense sufficient amount for indicated testing frequency plus additional to accommodate PRN testing needs. , Disp-100 strip, R-0, Normal  -     Lancets MISC; 2 TIMES DAILY Starting Thu 11/10/2022, Disp-100 each, R-5, Normal  -     gabapentin (NEURONTIN) 300 MG capsule; Take 1 capsule by mouth 3 times daily for 90 days. Patient takes 600 mg am, 600 mg at noon, 900 mg at night, Disp-90 capsule, R-1Normal    Extensive dietary counseling again today  He will track carbs and limit as directed in AVS  Check fasting bg daily, keep log, and do this prn for symptoms of hyper/hypoglycemia  Will increase ozempic for now to 2mg  2. Acute gout of left hand, unspecified cause  -     allopurinol (ZYLOPRIM) 100 MG tablet; Take 1 tablet by mouth daily, Disp-90 tablet, R-1Normal  No flare  Uric acid wnl  3. Primary hypertension  Stable  Normotensive  No changes  Lytes balanced    Return in about 3 months (around 2/10/2023) for diabetes .          Subjective   SUBJECTIVE/OBJECTIVE:  HPI:    Gout:  -last flare about one year prior  -not taking allopurinol (was reportedly told this treats flares not prevents them)  -uric acid was 6.7  -needs purine restricted diet       DM2:  -jardiance 10mg daily (no nausea, diarrheal illness knows to not take if this occurs, no perineal symptoms)  -glipizide 10mg daily with breakfast (no hypoglycemic episodes)  -metformin 1000mg with meal (no gi symptoms)  -ozempic 1mg weekly (no nausea, epigastric pain)     -fasting BG is 180s but does not routinely check    -eye exams with: \"no idea. .. [address] [facility] in [place]\"  -Dr. Kelli Hughes following with podiatry     -A1c last visit: 7.7%  -A1c today: 8.1%       HTN:  -atenolol  -benazepril  -hctz  -norvasc    -no untoward effects  -no alarm s/s    Preventative:  Health Maintenance   Topic Date Due    Diabetic foot exam  Never done    Diabetic microalbuminuria test  Never done    Diabetic retinal exam  Never done    Hepatitis B vaccine (1 of 3 - Risk 3-dose series) Never done    DTaP/Tdap/Td vaccine (1 - Tdap) Never done    Shingles vaccine (1 of 2) Never done    COVID-19 Vaccine (4 - Booster for Pfizer series) 12/11/2021    Depression Screen  07/28/2023    Lipids  08/18/2023    A1C test (Diabetic or Prediabetic)  11/10/2023    Colorectal Cancer Screen  09/20/2025    Flu vaccine  Completed    Pneumococcal 0-64 years Vaccine  Completed    Hepatitis A vaccine  Aged Out    Hib vaccine  Aged Out    Meningococcal (ACWY) vaccine  Aged Out    Hepatitis C screen  Discontinued    HIV screen  Discontinued           ROS:    Denies 10pt ROS other than noted in HPI. Objective       PHYSICAL EXAM:    /74   Pulse 99   Temp 97.7 °F (36.5 °C) (Temporal)   Resp 16   Ht 5' 10\" (1.778 m)   Wt 241 lb 11.2 oz (109.6 kg)   SpO2 98%   BMI 34.68 kg/m²     AVSS    GA: Well-groomed, appears well, no acute distress. HEENT: Atraumatic normocephalic. Extraocular muscles are grossly intact. Pupils are equal round reactive to light. Conjunctiva pink and moist.  Hearing is grossly intact. Nares patent without external drainage. Buccal mucosa pink and moist.  Posterior oropharynx clear without lesion or exudate. NECK: Trachea is midline, supple, nontender, no lymphadenopathy. CARDIO: Regular rate and rhythm without murmur rub or gallop. Cap refill 2+.   Radial pulses 2+ bilaterally. RESPIRATORY: Clear to auscultation bilaterally without wheezes rales or rhonchi. Normal inspiratory and expiratory effort. Normoxic on room air    ABD: Rounded, normoactive bowel sounds. Soft, nontender, no organomegaly. MSK: Structurally appropriate for age. No gross deficit. NEURO: Alert, no gross deficit. PSYCH:  Mood is normal and congruent with affect. No signs of psychomotor retardation or agitation. Thought content seems normal, speech is fluent and non-pressured. SKIN: Generally warm pink and dry. An electronic signature was used to authenticate this note.     --Babak Hendricks, DO

## 2022-12-01 DIAGNOSIS — E11.42 DIABETIC POLYNEUROPATHY ASSOCIATED WITH TYPE 2 DIABETES MELLITUS (HCC): ICD-10-CM

## 2022-12-01 RX ORDER — BLOOD-GLUCOSE METER
KIT MISCELLANEOUS
Qty: 100 STRIP | Refills: 0 | Status: SHIPPED | OUTPATIENT
Start: 2022-12-01

## 2022-12-06 ENCOUNTER — OFFICE VISIT (OUTPATIENT)
Dept: PODIATRY | Age: 57
End: 2022-12-06
Payer: COMMERCIAL

## 2022-12-06 VITALS — WEIGHT: 240 LBS | HEIGHT: 70 IN | BODY MASS INDEX: 34.36 KG/M2

## 2022-12-06 DIAGNOSIS — G60.9 IDIOPATHIC PERIPHERAL NEUROPATHY: ICD-10-CM

## 2022-12-06 DIAGNOSIS — B35.1 ONYCHOMYCOSIS: Primary | ICD-10-CM

## 2022-12-06 PROCEDURE — G8482 FLU IMMUNIZE ORDER/ADMIN: HCPCS | Performed by: PODIATRIST

## 2022-12-06 PROCEDURE — 3017F COLORECTAL CA SCREEN DOC REV: CPT | Performed by: PODIATRIST

## 2022-12-06 PROCEDURE — G8427 DOCREV CUR MEDS BY ELIG CLIN: HCPCS | Performed by: PODIATRIST

## 2022-12-06 PROCEDURE — 1036F TOBACCO NON-USER: CPT | Performed by: PODIATRIST

## 2022-12-06 PROCEDURE — 99213 OFFICE O/P EST LOW 20 MIN: CPT | Performed by: PODIATRIST

## 2022-12-06 PROCEDURE — G8417 CALC BMI ABV UP PARAM F/U: HCPCS | Performed by: PODIATRIST

## 2022-12-06 NOTE — PROGRESS NOTES
22     Sharee Hernandez. : 1965   Sex: male    Age: 59 y.o. Patient's PCP/Provider is:  James Mckeon DO    Subjective:  Patient is seen today for follow-up regarding mycotic nail issues noted right foot. Overall patient is doing well at this time and has been applying the topical medication as instructed. No other additional abnormalities noted at this time. Chief Complaint   Patient presents with    Nail Problem     Nail fungus        ROS:  Const: Positives and pertinent negatives as per HPI. Musculo: Denies symptoms other than stated above. Neuro: Denies symptoms other than stated above. Skin: Denies symptoms other than stated above. Current Medications:    Current Outpatient Medications:     blood glucose test strips (FREESTYLE LITE) strip, TEST BLOOD SUGAR TWICE DAILY AND AS NEEDED FOR SYMTOMS OF IRREGULAR BLOOD GLUCOSE, Disp: 100 strip, Rfl: 0    polyethylene glycol (GLYCOLAX) 17 GM/SCOOP powder, USE AS DIRECTED, Disp: , Rfl:     gabapentin (NEURONTIN) 600 MG tablet, TAKE 1 TABLET BY MOUTH IN THE MORNING 1 TABLET MIDDAY AND 1 TABLET EVERY NIGHT AT BEDTIME PLUS 300 MG CAPSULE, Disp: , Rfl:     glucose monitoring (FREESTYLE FREEDOM) kit, 1 kit by Does not apply route daily, Disp: 1 kit, Rfl: 0    Lancets MISC, 1 each by Does not apply route 2 times daily, Disp: 100 each, Rfl: 5    allopurinol (ZYLOPRIM) 100 MG tablet, Take 1 tablet by mouth daily, Disp: 90 tablet, Rfl: 1    gabapentin (NEURONTIN) 300 MG capsule, Take 1 capsule by mouth 3 times daily for 90 days.  Patient takes 600 mg am, 600 mg at noon, 900 mg at night, Disp: 90 capsule, Rfl: 1    Semaglutide, 2 MG/DOSE, 8 MG/3ML SOPN, Inject 2 mg into the skin once a week, Disp: 15 mL, Rfl: 3    vitamin D (ERGOCALCIFEROL) 1.25 MG (41972 UT) CAPS capsule, Take 1 capsule by mouth once a week Thursday, Disp: 12 capsule, Rfl: 0    glipiZIDE (GLUCOTROL) 10 MG tablet, Take 1 tablet by mouth daily, Disp: 90 tablet, Rfl: 0 Icosapent Ethyl (VASCEPA) 1 g CAPS capsule, Take 2 capsules by mouth 2 times daily, Disp: 360 capsule, Rfl: 2    amLODIPine (NORVASC) 10 MG tablet, TK 1 T PO QD, Disp: 90 tablet, Rfl: 3    atenolol (TENORMIN) 100 MG tablet, Take 1 tablet by mouth daily, Disp: 90 tablet, Rfl: 3    metFORMIN (GLUCOPHAGE) 1000 MG tablet, Take 1 tablet by mouth 2 times daily (with meals), Disp: 180 tablet, Rfl: 3    hydroCHLOROthiazide (HYDRODIURIL) 25 MG tablet, Take 1 tablet by mouth daily, Disp: 90 tablet, Rfl: 3    benazepril (LOTENSIN) 40 MG tablet, Take 1 tablet by mouth daily, Disp: 90 tablet, Rfl: 3    ciclopirox (PENLAC) 8 % solution, Apply topically daily to affected nails. , Disp: 6 mL, Rfl: 2    Folinic Acid-Vit B6-Vit B12 (FOLINIC-PLUS) 4-50-2 MG TABS, Take 4-50 mg by mouth 2 times daily, Disp: 90 tablet, Rfl: 2    omeprazole (PRILOSEC) 20 MG delayed release capsule, TAKE 1 CAPSULE BY MOUTH EVERY MORNING BEFORE BREAKFAST, Disp: 30 capsule, Rfl: 5    amitriptyline (ELAVIL) 10 MG tablet, , Disp: , Rfl:     empagliflozin (JARDIANCE) 10 MG tablet, Take 1 tablet by mouth in the morning., Disp: 30 tablet, Rfl: 3    zoster recombinant adjuvanted vaccine (SHINGRIX) 50 MCG/0.5ML SUSR injection, Inject 0.5 mLs into the muscle See Admin Instructions 1 dose now and repeat in 2-6 months, Disp: 0.5 mL, Rfl: 0    sildenafil (VIAGRA) 100 MG tablet, 1 tab(s), Disp: , Rfl:     Blood Glucose Monitoring Suppl (TRUE METRIX METER) [place], as directed, Disp: , Rfl:     aspirin 81 MG EC tablet, Take 81 mg by mouth daily, Disp: , Rfl:     atorvastatin (LIPITOR) 10 MG tablet, Take 10 mg by mouth daily, Disp: , Rfl:     vitamin B-12 (CYANOCOBALAMIN) 1000 MCG tablet, Take 1,000 mcg by mouth daily, Disp: , Rfl:     Multiple Vitamin (MULTIVITAMIN ADULT PO), Take by mouth daily, Disp: , Rfl:     Allergies:  No Known Allergies    Vitals:    12/06/22 1518   Weight: 240 lb (108.9 kg)   Height: 5' 10\" (1.778 m)       Exam:  Neurovascular status unchanged.   Nail dystrophy issues resolved right great toe. No maceration webspaces noted right foot. No plantar calluses, ulcerations, heel fissuring noted right foot. Adequate range of motion right ankle and subtalar joint regions. Diagnostic Studies:     No results found. Procedures:    None    Plan Per Assessment  Anthony Beck was seen today for nail problem. Diagnoses and all orders for this visit:    Onychomycosis    Idiopathic peripheral neuropathy      Evaluation and management  We did discuss additional nail trimming techniques to prevent reoccurrence of issues. Did discuss additional diabetic foot care options with patient in detail today to prevent any additional lower extremity issues from occurring. Patient will be followed up at a later date for continued evaluation and management. Seen By:    Jelani Garcia DPM    Electronically signed by Jelani Garcia DPM on 12/6/2022 at 3:51 PM    This note was created using voice recognition software. The note was reviewed however may contain grammatical errors.

## 2022-12-19 ENCOUNTER — OFFICE VISIT (OUTPATIENT)
Dept: FAMILY MEDICINE CLINIC | Age: 57
End: 2022-12-19
Payer: COMMERCIAL

## 2022-12-19 VITALS
BODY MASS INDEX: 34.65 KG/M2 | SYSTOLIC BLOOD PRESSURE: 124 MMHG | HEART RATE: 89 BPM | OXYGEN SATURATION: 97 % | RESPIRATION RATE: 16 BRPM | TEMPERATURE: 97.2 F | WEIGHT: 242 LBS | HEIGHT: 70 IN | DIASTOLIC BLOOD PRESSURE: 72 MMHG

## 2022-12-19 DIAGNOSIS — Z23 NEED FOR DIPHTHERIA-TETANUS-PERTUSSIS (TDAP) VACCINE: ICD-10-CM

## 2022-12-19 DIAGNOSIS — Z00.00 ENCOUNTER FOR WELL ADULT EXAM WITHOUT ABNORMAL FINDINGS: Primary | ICD-10-CM

## 2022-12-19 DIAGNOSIS — M10.9 ACUTE GOUT OF LEFT HAND, UNSPECIFIED CAUSE: ICD-10-CM

## 2022-12-19 DIAGNOSIS — H61.21 HEARING LOSS OF RIGHT EAR DUE TO CERUMEN IMPACTION: ICD-10-CM

## 2022-12-19 DIAGNOSIS — E11.42 DIABETIC POLYNEUROPATHY ASSOCIATED WITH TYPE 2 DIABETES MELLITUS (HCC): ICD-10-CM

## 2022-12-19 DIAGNOSIS — E78.5 HYPERLIPIDEMIA, UNSPECIFIED HYPERLIPIDEMIA TYPE: ICD-10-CM

## 2022-12-19 DIAGNOSIS — I10 PRIMARY HYPERTENSION: ICD-10-CM

## 2022-12-19 DIAGNOSIS — Z23 NEED FOR PROPHYLACTIC VACCINATION WITH TETANUS-DIPHTHERIA (TD): ICD-10-CM

## 2022-12-19 PROBLEM — G47.33 OBSTRUCTIVE SLEEP APNEA: Status: ACTIVE | Noted: 2020-11-12

## 2022-12-19 PROBLEM — I45.10 RIGHT BUNDLE BRANCH BLOCK: Status: ACTIVE | Noted: 2022-12-19

## 2022-12-19 PROCEDURE — G8417 CALC BMI ABV UP PARAM F/U: HCPCS | Performed by: SURGERY

## 2022-12-19 PROCEDURE — G8427 DOCREV CUR MEDS BY ELIG CLIN: HCPCS | Performed by: SURGERY

## 2022-12-19 PROCEDURE — 99212 OFFICE O/P EST SF 10 MIN: CPT | Performed by: SURGERY

## 2022-12-19 PROCEDURE — 99396 PREV VISIT EST AGE 40-64: CPT | Performed by: SURGERY

## 2022-12-19 PROCEDURE — G8482 FLU IMMUNIZE ORDER/ADMIN: HCPCS | Performed by: SURGERY

## 2022-12-19 PROCEDURE — 3017F COLORECTAL CA SCREEN DOC REV: CPT | Performed by: SURGERY

## 2022-12-19 PROCEDURE — 69210 REMOVE IMPACTED EAR WAX UNI: CPT | Performed by: SURGERY

## 2022-12-19 PROCEDURE — 1036F TOBACCO NON-USER: CPT | Performed by: SURGERY

## 2022-12-19 RX ORDER — ALLOPURINOL 200 MG/1
200 TABLET ORAL DAILY
Qty: 90 TABLET | Refills: 1 | Status: SHIPPED | OUTPATIENT
Start: 2022-12-19

## 2022-12-19 NOTE — PATIENT INSTRUCTIONS
Advance Directives: Care Instructions  Overview  An advance directive is a legal way to state your wishes at the end of your life. It tells your family and your doctor what to do if you can't say what you want. There are two main types of advance directives. You can change them any time your wishes change. Living will. This form tells your family and your doctor your wishes about life support and other treatment. The form is also called a declaration. Medical power of . This form lets you name a person to make treatment decisions for you when you can't speak for yourself. This person is called a health care agent (health care proxy, health care surrogate). The form is also called a durable power of  for health care. If you do not have an advance directive, decisions about your medical care may be made by a family member, or by a doctor or a  who doesn't know you. It may help to think of an advance directive as a gift to the people who care for you. If you have one, they won't have to make tough decisions by themselves. For more information, including forms for your state, see the [address] website (www.caringinfo.org/planning/advance-directives/). Follow-up care is a key part of your treatment and safety. Be sure to make and go to all appointments, and call your doctor if you are having problems. It's also a good idea to know your test results and keep a list of the medicines you take. What should you include in an advance directive? Many states have a unique advance directive form. (It may ask you to address specific issues.) Or you might use a universal form that's approved by many states. If your form doesn't tell you what to address, it may be hard to know what to include in your advance directive. Use the questions below to help you get started. Who do you want to make decisions about your medical care if you are not able to?   What life-support measures do you want if you have a serious illness that gets worse over time or can't be cured? What are you most afraid of that might happen? (Maybe you're afraid of having pain, losing your independence, or being kept alive by machines.)  Where would you prefer to die? (Your home? A hospital? A nursing home?)  Do you want to donate your organs when you die? Do you want certain Mandaeism practices performed before you die? When should you call for help? Be sure to contact your doctor if you have any questions. Where can you learn more? Go to http://www.tejeda.com/ and enter R264 to learn more about \"Advance Directives: Care Instructions. \"  Current as of: June 16, 2022               Content Version: 13.5  © 2006-2022 Healthwise, [employer]. Care instructions adapted under license by [facility] ([facility]). If you have questions about a medical condition or this instruction, always ask your healthcare professional. [place] [address] any warranty or liability for your use of this information. Learning About Medical Power of   What is a medical power of ? A medical power of , also called a durable power of  for health care, is one type of the legal forms called advance directives. It lets you name the person you want to make treatment decisions for you if you can't speak or decide for yourself. The person you choose is called your health care agent. This person is also called a health care proxy or health care surrogate. A medical power of  may be called something else in your state. How do you choose a health care agent? Choose your health care agent carefully. This person may or may not be a family member. Talk to the person before you make your final decision. Make sure he or she is comfortable with this responsibility. It's a good idea to choose someone who:  Is at least 25years old.   Knows you well and understands what makes life meaningful for you.  Understands your Orthodox and moral values. Will do what you want, not what he or she wants. Will be able to make difficult choices at a stressful time. Will be able to refuse or stop treatment, if that is what you would want, even if you could die. Will be firm and confident with health professionals if needed. Will ask questions to get needed information. Lives near you or agrees to travel to you if needed. Your family may help you make medical decisions while you can still be part of that process. But it's important to choose one person to be your health care agent in case you aren't able to make decisions for yourself. If you don't fill out the legal form and name a health care agent, the decisions your family can make may be limited. A health care agent may be called something else in your state. Who will make decisions for you if you don't have a health care agent? If you don't have a health care agent or a living will, you may not get the care you want. Decisions may be made by family members who disagree about your medical care. Or decisions may be made by a medical professional who doesn't know you well. In some cases, a  makes the decisions. When you name a health care agent, it is very clear who has the power to make health decisions for you. How do you name a health care agent? You name your health care agent on a legal form. This form is usually called a medical power of . Ask your hospital, state bar association, or office on aging where to find these forms. You must sign the form to make it legal. Some states require you to get the form notarized. This means that a person called a  watches you sign the form and then he or she signs the form. Some states also require that two or more witnesses sign the form. Be sure to tell your family members and doctors who your health care agent is. Where can you learn more?   Go to http://IGLOO Software.woods.com/ and enter P737 to learn more about \"Learning About [address]. \"  Current as of: October 6, 2021               Content Version: 13.5  © 2006-2022 Healthwise, [employer]. Care instructions adapted under license by [facility] ([facility]). If you have questions about a medical condition or this instruction, always ask your healthcare professional. [address] any warranty or liability for your use of this information. Learning About Living Perroy  What is a living will? A living will, also called a declaration, is a legal form. It tells your family and your doctor your wishes when you can't speak for yourself. It's used by the health professionals who will treat you as you near the end of your life or if you get seriously hurt or ill. If you put your wishes in writing, your loved ones and others will know what kind of care you want. They won't need to guess. This can ease your mind and be helpful to others. And you can change or cancel your living will at any time. A living will is not the same as an estate or property will. An estate will explains what you want to happen with your money and property after you die. How do you use it? Keep these facts in mind about how a living will is used. Your living will is used only if you can't speak or make decisions for yourself. Most often, one or more doctors must certify that you can't speak or decide for yourself before your living will takes effect. If you get better and can speak for yourself again, you can accept or refuse any treatment. It doesn't matter what you said in your living will. Some states may limit your right to refuse treatment in certain cases. For example, you may need to clearly state in your living will that you don't want artificial hydration and nutrition, such as being fed through a tube. Is a living will a legal document? A living will is a legal document.  Each state has its own laws about living wetzel. And a living will may be called something else in your state. Here are some things to know about living wetzel. You don't need an  to complete a living will. But legal advice can be helpful if your state's laws are unclear. It can also help if your health history is complicated or your family can't agree on what should be in your living will. You can change your living will at any time. Some people find that their wishes about end-of-life care change as their health changes. If you make big changes to your living will, complete a new form. If you move to another state, make sure that your living will is legal in the state where you now live. In most cases, doctors will respect your wishes even if you have a form from a different state. You might use a universal form that has been approved by many states. This kind of form can sometimes be filled out and stored online. Your digital copy will then be available wherever you have a connection to the internet. The doctors and nurses who need to treat you can find it right away. Your state may offer an online registry. This is another place where you can store your living will online. It's a good idea to get your living will notarized. This means using a person called a  to watch two people sign, or witness, your living will. What should you know when you create a living will? Here are some questions to ask yourself as you make your living will. Do you know enough about life support methods that might be used? If not, talk to your doctor so you know what might be done if you can't breathe on your own, your heart stops, or you can't swallow. What things would you still want to be able to do after you receive life-support methods? Would you want to be able to walk? To speak? To eat on your own? To live without the help of machines?   Do you want certain Evangelical practices performed if you become very ill?  If you have a choice, where do you want to be cared for? In your home? At a hospital or nursing home? If you have a choice at the end of your life, where would you prefer to die? At home? In a hospital or nursing home? Somewhere else? Would you prefer to be buried or cremated? Do you want your organs to be donated after you die? What should you do with your living will? Make sure that your family members and your health care agent have copies of your living will (also called a declaration). Give your doctor a copy of your living will. Ask to have it kept as part of your medical record. If you have more than one doctor, make sure that each one has a copy. Put a copy of your living will where it can be easily found. For example, some people may put a copy on their refrigerator door. If you are using a digital copy, be sure your doctor, family members, and health care agent know how to find and access it. Where can you learn more? Go to http://www.tejeda.com/ and enter K356 to learn more about \"Learning About Living Perroy. \"  Current as of: June 16, 2022               Content Version: 13.5  © 4020-0544 Healthwise, [facility]. Care instructions adapted under license by [facility] ([facility]). If you have questions about a medical condition or this instruction, always ask your healthcare professional. [address] any warranty or liability for your use of this information. High-Fiber Diet     What Is Fiber? Dietary fiber is a form of carbohydrate found in plants that cannot be digested by humans. All plants contain fiber, including fruits, vegetables, grains, and legumes. Fiber is often classified into two categories: soluble and insoluble. Soluble fiber draws water into the bowel and can help slow digestion. Examples of foods that are high in soluble fiber include oatmeal, oat bran, barley, legumes (eg, beans and peas), apples, and strawberries.    Insoluble fiber speeds digestion and can add bulk to the stool. Examples of foods that are high in insoluble fiber include whole-wheat products, wheat bran, cauliflower, green beans, and potatoes. Why Follow a High-Fiber Diet? A high-fiber diet is often recommended to prevent and treat constipation , hemorrhoids , diverticulitis , and irritable bowel syndrome . Eating a high-fiber diet can also help improve your cholesterol levels, lower your risk of coronary heart disease , reduce your risk of type 2 diabetes , and lower your weight. For people with type 1 or 2 diabetes, a high-fiber diet can also help stabilize blood sugar levels. How Much Fiber Should I Eat? A high-fiber diet should contain  20-35 grams  of fiber a day. This is actually the amount recommended for the general adult population; however, most Americans eat only 15 grams of fiber per day. Digestion of Fiber   Eating a higher fiber diet than usual can take some getting used to by your body's digestive system. To avoid the side effects of sudden increases in dietary fiber (eg, gas, cramping, bloating, and diarrhea), increase fiber gradually and be sure to drink plenty of fluids every day. Tips for Increasing Fiber Intake   Whenever possible, choose whole grains over refined grains (eg, brown rice instead of white rice, whole-wheat bread instead of white bread). Include a variety of grains in your diet, such as wheat, rye, barley, oats, quinoa, and bulgur. Eat more vegetarian-based meals. Here are some ideas: black bean burgers, eggplant lasagna, and veggie tofu stir-li. Choose high-fiber snacks, such as fruits, popcorn, whole-grain crackers, and nuts. Make whole-grain cereal or whole-grain toast part of your daily breakfast regime. When eating out, whether ordering a sandwich or dinner, ask for extra vegetables. When baking, replace part of the white flour with whole-wheat flour.  Whole-wheat flour is particularly easy to incorporate into a recipe. High-Fiber Diet Eating Guide   Food Category   Foods Recommended   Notes   Grains   Whole-grain breads, muffins, bagels, or nereida bread Rye bread Whole-wheat crackers or crisp breads Whole-grain or bran cereals Oatmeal, oat bran, or grits Wheat germ Whole-wheat pasta and brown rice   Read the ingredients list on food labels. Look for products that list \"whole\" as the first ingredient (eg, whole-wheat, whole oats). Choose cereals with at least 2 grams of fiber per serving. Vegetables   All vegetables, especially asparagus, bean sprouts, broccoli, [place] sprouts, cabbage, carrots, cauliflower, celery, corn, greens, green beans, green pepper, onions, peas, potatoes (with skin), snow peas, spinach, squash, sweet potatoes, tomatoes, zucchini   For maximum fiber intake, eat the peels of fruits and vegetablesjust be sure to wash them well first.   Fruits   All fruits, especially apples, berries, grapefruits, mangoes, nectarines, oranges, peaches, pears, dried fruits (figs, dates, prunes, raisins)   Choose raw fruits and vegetables over juice, cooked, or cannedraw fruit has more fiber. Dried fruit is also a good source of fiber. Milk   With the exception of yogurt containing inulin (a type of fiber), dairy foods provide little fiber. Add more fiber by topping your yogurt or cottage cheese with fresh fruit, whole grain or bran cereals, nuts, or seeds. Meats and Beans   All beans and peas, especially Garbanzo beans, kidney beans, lentils, lima beans, split peas, and rodriguez beans All nuts and seeds, especially almonds, peanuts, [place] nuts, cashews, peanut butter, walnuts, sesame and sunflower seeds All meat, poultry, fish, and eggs   Increase fiber in meat dishes by adding rodriguez beans, kidney beans, black-eyed peas, bran, or oatmeal. If you are following a low-fat diet, use nuts and seeds only in moderation.    Fats and Oils   All in moderation   Fats and oils do not provide fiber   Snacks, Sweets, and Condiments   Fruit Nuts Popcorn, whole-wheat pretzels, or trail mix made with dried fruits, nuts, and seeds Cakes, breads, and cookies made with oatmeal or whole-wheat flour   Most snack foods do not provide much fiber. Choose snacks with at least 2 grams of fiber per serving. Last Reviewed: March 2011 Edwardo Parikh MS, MPH, RD   Updated: 3/29/2011     Keep Your Memory Yaya Petersen       Many factors can affect your ability to remembera hectic lifestyle, aging, stress, chronic disease, and certain medicines. But, there are steps you can take to sharpen your mind and help preserve your memory. Challenge Your Brain   Regularly challenging your mind may help keeps it in top shape. Good mental exercises include:   Crossword puzzlesUse a dictionary if you need it; you will learn more that way. Brainteasers Try some! Crafts, such as wood working and sewing   Hobbies, such as gardening and building model airplanes   SocializingVisit old friends or join groups to meet new ones. Reading   Learning a new language   Taking a class, whether it be art history or lake chi   TravelingExperience the food, history, and culture of your destination   Learning to use a computer   Going to museums, the theater, or thought-provoking movies   Changing things in your daily life, such as reversing your pattern in the grocery store or brushing your teeth using your nondominant hand   Use Memory Aids   There is no need to remember every detail on your own. These memory aids can help:   Calendars and day planners   Electronic organizers to store all sorts of helpful informationThese devices can \"beep\" to remind you of appointments. A book of days to record birthdays, anniversaries, and other occasions that occur on the same date every year   Detailed \"to-do\" lists and strategically placed sticky notes   Quick \"study\" sessionsBefore a gathering, review who will be there so their names will be fresh in your mind.    Establish routinesFor example, keep your keys, wallet, and umbrella in the same place all the time or take medicine with your 8:00 AM glass of juice   Live a Healthy Life   Many actions that will keep your body strong will do the same for your mind. For example:   Talk to Your Doctor About Herbs and Supplements    Malnutrition and vitamin deficiencies can impair your mental function. For example, vitamin B12 deficiency can cause a range of symptoms, including confusion. But, what if your nutritional needs are being met? Can herbs and supplements still offer a benefit? Researchers have investigated a range of natural remedies, such as ginkgo , ginseng , and the supplement phosphatidylserine (PS). So far, though, the evidence is inconsistent as to whether these products can improve memory or thinking. If you are interested in taking herbs and supplements, talk to your doctor first because they may interact with other medicines that you are taking. Exercise Regularly    Among the many benefits of regular exercise are increased blood flow to the brain and decreased risk of certain diseases that can interfere with memory function. One study found that even moderate exercise has a beneficial effect. Examples of \"moderate\" exercise include:   Playing 18 holes of golf once a week, without a cart   Playing tennis twice a week   Walking one mile per day   Manage Stress    It can be tough to remember what is important when your mind is cluttered. Make time for relaxation. Choose activities that calm you down, and make it routine. Manage Chronic Conditions    Side effects of high blood pressure , diabetes, and heart disease can interfere with mental function. Many of the lifestyle steps discussed here can help manage these conditions. Strive to eat a healthy diet, exercise regularly, get stress under control, and follow your doctor's advice for your condition. Minimize Medications    Talk to your doctor about the medicines that you take. Some may be unnecessary. Also, healthy lifestyle habits may lower the need for certain drugs. Last Reviewed: April 2010 Santy Contreras MD   Updated: 4/13/2010          Advance Care Planning: Care Instructions  Your Care Instructions     It can be hard to live with an illness that cannot be cured. But if your health is getting worse, you may want to make decisions about end-of-life care. Planning for the end of your life does not mean that you are giving up. It is a way to make sure that your wishes are met. Clearly stating your wishes can make it easier for your loved ones. Making plans while you are still able may also ease your mind and make your final days less stressful and more meaningful. Follow-up care is a key part of your treatment and safety. Be sure to make and go to all appointments, and call your doctor if you are having problems. It's also a good idea to know your test results and keep a list of the medicines you take. What can you do to plan for the end of life? You can bring these issues up with your doctor. You do not need to wait until your doctor starts the conversation. You might start with, \"What makes life worth living for me is. Kirsten Quiver .\" And then follow it with, \"I would not be willing to live with . Kirsten Quiver Kirsten Quiver \" When you complete this sentence it helps your doctor understand your wishes. Talk openly and honestly with your doctor. This is the best way to understand the decisions you will need to make as your health changes. Know that you can always change your mind. Ask your doctor about commonly used life-support measures. These include tube feedings, breathing machines, and fluids given through a vein (IV). Understanding these treatments will help you decide whether you want them. You may choose to have these life-supporting treatments for a limited time. This allows a trial period to see whether they will help you.  You may also decide that you want your doctor to take only certain measures to keep you alive. It may help to think about the big picture, like what makes life worth living for you or what your values and goals are. Talk to your doctor about how long you are likely to live. Your doctor may be able to give you an idea of what usually happens with your specific illness. Think about preparing papers that state your wishes. These papers are called advance directives. If you do this early and review them often, there will not be any confusion about what you want. You can change your instructions at any time. Which papers should you prepare? Advance directives are legal papers that tell doctors how you want to be cared for at the end of your life. You do not need a  to write these papers. Ask your doctor or your state [facility] department for information on how to write your advance directives. They may have the forms for each of these types of papers. Make sure your doctor has a copy of these on file, and give a copy to a family member or close friend. Consider a do-not-resuscitate order (DNR). This order asks that no extra treatments be done if your heart stops or you stop breathing. Extra treatments may include cardiopulmonary resuscitation (CPR), electrical shock to restart your heart, or a machine to breathe for you. If you decide to have a DNR order, ask your doctor to explain and write it. Place the order in your home where everyone can easily see it. Consider a living will. A living will explains your wishes about life support and other treatments at the end of your life if you become unable to speak for yourself. Living wetzel tell doctors to use or not use treatments that would keep you alive. You must have one or two witnesses or a notary present when you sign this form. A living will may be called something else in your state. Consider a medical power of . This form allows you to name a person to make decisions about your care if you are not able to.  Most people ask a close friend or family member. Talk to this person about the kinds of treatments you want and those that you do not want. Make sure this person understands your wishes. A medical power of  may be called something else in your state. These legal papers are simple to change. Tell your doctor what you want to change, and ask him or her to make a note in your medical file. Give your family updated copies of the papers. Where can you learn more? Go to http://www.tejeda.com/ and enter P184 to learn more about \"Advance Care Planning: Care Instructions. \"  Current as of: October 6, 2021               Content Version: 13.5  © 2621-5027 Healthwise, [employer]. Care instructions adapted under license by [facility] ([facility]). If you have questions about a medical condition or this instruction, always ask your healthcare professional. [address] any warranty or liability for your use of this information. ______________________________________________    Eat between 1200 and 1400 Calories per day to loose weight  -Carbohydrates limit to 40 to 50g per meal (120g to 150g per day)  -Fats limit to 60g per day (total fat intake should be 30% to 35% of your total daily calories, so restrict to whichever number is lower)   -Of the fats you do eat, never eat trans fats, never eat unsaturated fats, limit your saturated fat intake to less than 20g per day (or 7% of your total daily calories, so restrict to whichever number is lower)  -Cholesterol limit to no more than 300mg per day (200mg per day if you have elevated triglycerides or total cholesterol)  -Sodium less than 2000mg per day    MyWellRightPal or similar application (or track by journal) to monitor calories and macronutrients. This is the most critical component to a heart healthy, balanced diet: honesty, keeping oneself accountable, ensure you are tracking daily goals and meeting them. Food is medicine!                 Counseled the patient on importance of cardiovascular and resistance training. Make every attempt to engage in a level of activity, sustained for at least 30 minutes, where you saturate your undergarments with sweat. This should be done, at a minimum, three times per week. Baby Oil  -should be kept at and administered at room temperature. -Instill 3-5 drops into the affected ear at bedtime. Over night the oil will turn the hard/dry wax into a liquid substance. -In the morning, simply clean the outer part of the ear with a washcloth to remove the residual oil and ear wax. -How often should mineral oil be used? During times of problematic wax buildup, mineral oil can be used daily. For maintenance, mineral oil can be administered one or two nights per week.

## 2022-12-19 NOTE — PROGRESS NOTES
Well Adult Note  [facility] Date[facility]   MRN: [id number] Sex: Male   Age: 59 y.o. Ethnicity: Non- / Non    : 1965 Race: White (non-)      Glo Moore Normal. is here for well adult exam.  History:    Prostate cancer screening  -     PSA Screening; Future  -     Shared decision making. PEPITO is less effective than the PSA blood test in finding prostate cancer, but it can sometimes find cancers in men with normal PSA levels. Patient is understanding of this and risks versus benefits. PEPITO deferred. PSA 0.00 - 4.00 ng/mL 0.30        Acute gout of left hand, unspecified cause  -     allopurinol (ZYLOPRIM) 100 MG tablet; Take 1 tablet by mouth in the morning., Disp-7 tablet, R-0Normal  -     Uric Acid; Future  -     increase allopurinol to 200mg  -     Uric acid to assess efficacy   Uric Acid, Serum 3.4 - 7.0 mg/dL 6.7        Hyperlipidemia, unspecified hyperlipidemia type  -     Lipid, Fasting; Future  - Aggressive lifestyle modification  Component Ref Range & Units 22 1146    Cholesterol, Fasting 0 - 199 mg/dL 136    Triglyceride, Fasting 0 - 149 mg/dL 341 High     HDL >40 mg/dL 33    LDL Calculated 0 - 99 mg/dL 35    VLDL Cholesterol Calculated mg/dL 68        Primary hypertension  -     CBC with Auto Differential; Future  -     Comprehensive Metabolic Panel; Future  -     Normotensive no changes. Stable.   Needs to follow a sodium restricted diet  - atenolol  - benazepril  - hctz  - norvasc    Diabetic polyneuropathy associated with type 2 diabetes mellitus (HCC)  -     POCT glycosylated hemoglobin (Hb A1C)  -     Teresa Lew, [place], Podiatry, [place]  -jardiance 10mg daily (no nausea, diarrheal illness knows to not take if this occurs, no perineal symptoms)  -glipizide 10mg daily with breakfast (no hypoglycemic episodes)  -metformin 1000mg with meal (no gi symptoms)  -ozempic 1mg weekly (no nausea, epigastric pain)     -fasting BG is 180s but does not routinely check     -eye exams with: \"no idea. ..  [facility] [address] [facility] in [place]\"  -Dr. Efrem Unger following with podiatry      -A1c last visit: 8.1% (not due today)    Average fasting lois: 178  Average fastin  Average zenith:  250    -no changes this visit, patient will try his best to control diet  Review of Systems    10pt ros negative other than noted above     No Known Allergies    [facility] with Neurology (was placed on gabapentin for neuropathy)  Dr. Jerrell Gonsalves (GI)    Prior to Visit Medications    Medication Sig Taking? Authorizing Provider   allopurinol 200 MG TABS Take 200 mg by mouth daily Yes Ayaan Shields, DO   Tdap (ADACEL) 5-2-15.5 LF-MCG/0.5 injection Inject 0.5 mLs into the muscle once for 1 dose Yes Ayaan Shields, DO   polyethylene glycol (GLYCOLAX) 17 GM/SCOOP powder USE AS DIRECTED Yes Historical Provider, MD   gabapentin (NEURONTIN) 600 MG tablet TAKE 1 TABLET BY MOUTH IN THE MORNING 1 TABLET MIDDAY AND 1 TABLET EVERY NIGHT AT BEDTIME PLUS 300 MG CAPSULE Yes Historical Provider, MD   glucose monitoring (FREESTYLE FREEDOM) kit 1 kit by Does not apply route daily Yes Ayaan Shields, DO   Lancets MISC 1 each by Does not apply route 2 times daily Yes Ayaan Shields, DO   gabapentin (NEURONTIN) 300 MG capsule Take 1 capsule by mouth 3 times daily for 90 days.  Patient takes 600 mg am, 600 mg at noon, 900 mg at night Yes Ayaan Shields, DO   Semaglutide, 2 MG/DOSE, 8 MG/3ML SOPN Inject 2 mg into the skin once a week Yes Ayaan Shields, DO   vitamin D (ERGOCALCIFEROL) 1.25 MG (40608 UT) CAPS capsule Take 1 capsule by mouth once a week Thursday Yes Anny Youngblood, DO   glipiZIDE (GLUCOTROL) 10 MG tablet Take 1 tablet by mouth daily Yes Anny Youngblood, DO   Icosapent Ethyl (VASCEPA) 1 g CAPS capsule Take 2 capsules by mouth 2 times daily Yes Ayaan Shields, DO   amLODIPine (NORVASC) 10 MG tablet TK 1 T PO QD Yes Ayaan Shields, DO   atenolol (TENORMIN) 100 MG tablet Take 1 tablet by mouth daily Yes Jennifer Rough, DO   metFORMIN (GLUCOPHAGE) 1000 MG tablet Take 1 tablet by mouth 2 times daily (with meals) Yes Jennifer Saez, DO   hydroCHLOROthiazide (HYDRODIURIL) 25 MG tablet Take 1 tablet by mouth daily Yes Jennifer Rough, DO   benazepril (LOTENSIN) 40 MG tablet Take 1 tablet by mouth daily Yes Jennifer Rough, DO   Folinic Acid-Vit B6-Vit B12 (FOLINIC-PLUS) 4-50-2 MG TABS Take 4-50 mg by mouth 2 times daily Yes Amarjit Padilla, DPM   omeprazole (PRILOSEC) 20 MG delayed release capsule TAKE 1 CAPSULE BY MOUTH EVERY MORNING BEFORE BREAKFAST Yes Jennifer Saez, DO   amitriptyline (ELAVIL) 10 MG tablet  Yes Historical Provider, MD   empagliflozin (JARDIANCE) 10 MG tablet Take 1 tablet by mouth in the morning. Yes Jennifer Saez, DO   zoster recombinant adjuvanted vaccine [facility]) 50 MCG/0.5ML SUSR injection Inject 0.5 mLs into the muscle See Admin Instructions 1 dose now and repeat in 2-6 months Yes Jennifer Saez, DO   sildenafil (VIAGRA) 100 MG tablet 1 tab(s) Yes Historical Provider, MD   aspirin 81 MG EC tablet Take 81 mg by mouth daily Yes Historical Provider, MD   atorvastatin (LIPITOR) 10 MG tablet Take 10 mg by mouth daily Yes Historical Provider, MD   vitamin B-12 (CYANOCOBALAMIN) 1000 MCG tablet Take 1,000 mcg by mouth daily Yes Historical Provider, MD   Multiple Vitamin (MULTIVITAMIN ADULT PO) Take by mouth daily Yes Historical Provider, MD   blood glucose test strips (FREESTYLE LITE) strip TEST BLOOD SUGAR TWICE DAILY AND AS NEEDED FOR SYMTOMS OF IRREGULAR BLOOD GLUCOSE  Jennifer Saez, DO   ciclopirox (PENLAC) 8 % solution Apply topically daily to affected nails.   Rose Hart, ELVA   Blood Glucose Monitoring Suppl (TRUE METRIX METER) [place] as directed  Historical Provider, MD           Past Medical History:   Diagnosis Date    DM2 (diabetes mellitus, type 2) (HCC)     High triglycerides     HTN (hypertension)     Neuropathy     feet    Sleep apnea     Vitamin D deficiency        Past Surgical History:   Procedure Laterality Date    CARPAL TUNNEL RELEASE      b/l    LUNG BIOPSY      ROTATOR CUFF REPAIR  2020    right    UPPER GASTROINTESTINAL ENDOSCOPY  08/12/2021    Dr. Lio Rooney         Family History   Problem Relation Age of Onset    Crohn's Disease Mother     Pancreatic Cancer Father         dx age 77    Colon Cancer Neg Hx     Stomach Cancer Neg Hx     Liver Cancer Neg Hx     Esophageal Cancer Neg Hx     Ulcerative Colitis Neg Hx        Social History     Tobacco Use    Smoking status: Never    Smokeless tobacco: Never   Vaping Use    Vaping Use: Never used   Substance Use Topics    Alcohol use: Yes     Comment: occ    Drug use: Not Currently     Types: Marijuana Santos Hotter), Cocaine       Objective   /72   Pulse 89   Temp 97.2 °F (36.2 °C) (Temporal)   Resp 16   Ht 5' 10\" (1.778 m)   Wt 242 lb (109.8 kg)   SpO2 97%   BMI 34.72 kg/m²   Wt Readings from Last 3 Encounters:   12/19/22 242 lb (109.8 kg)   12/06/22 240 lb (108.9 kg)   11/10/22 241 lb 11.2 oz (109.6 kg)     There were no vitals filed for this visit. Physical Exam    GA: Well-groomed, appears well, no acute distress. HEENT: Atraumatic normocephalic. Extraocular muscles are grossly intact. Pupils are equal round reactive to light. Conjunctiva pink and moist.  Hearing is grossly intact save for right ear with grossly decreased hearing. Right EAC 90% occluded with cerumen. Nares patent without external drainage. Buccal mucosa pink and moist.  Posterior oropharynx clear without lesion or exudate. NECK: Trachea is midline, supple, nontender, no lymphadenopathy. CARDIO: Regular rate and rhythm without murmur rub or gallop. Cap refill 2+. Radial pulses 2+ bilaterally. RESPIRATORY: Clear to auscultation bilaterally without wheezes rales or rhonchi. Normal inspiratory and expiratory effort. Normoxic on room air     ABD: Rounded, normoactive bowel sounds.   Soft, ear wax. -How often should mineral oil be used? During times of problematic wax buildup, mineral oil can be used daily. For maintenance, mineral oil can be administered one or two nights per week. 7. Hyperlipidemia, unspecified hyperlipidemia type  Ascvd calculation  No change  8. Diabetic polyneuropathy associated with type 2 diabetes mellitus ([facility] [address]  Will work on diet  Next visit will add in basal insulin plus weekly glp1 or similar  9.  Primary hypertension   Stable  Normotensive  No change        Personalized Preventive Plan   Current Health Maintenance Status  Immunization History   Administered Date(s) Administered    COVID-19, PFIZER GRAY top, DO NOT Dilute, (age 15 y+), IM, 30 mcg/0.3 mL 08/23/2022    COVID-19, PFIZER PURPLE top, DILUTE for use, (age 15 y+), 30mcg/0.3mL 03/17/2021, 04/07/2021, 10/16/2021    Influenza Virus Vaccine 12/06/2018    Influenza, FLUAD, (age 72 y+), Adjuvanted, 0.5mL 12/02/2019, 09/21/2020    Influenza, FLUARIX, FLULAVAL, FLUZONE (age 10 mo+) AND AFLURIA, (age 1 y+), PF, 0.5mL 09/21/2020, 12/30/2021    Influenza, FLUCELVAX, (age 10 mo+), MDCK, PF, 0.5mL 11/10/2022    Influenza, High Dose (Fluzone 65 yrs and older) 12/02/2019    Pneumococcal conjugate PCV20, PF (Prevnar 20) 11/10/2022        Health Maintenance   Topic Date Due    Diabetic foot exam  Never done    Diabetic retinal exam  Never done    DTaP/Tdap/Td vaccine (1 - Tdap) Never done    Shingles vaccine (1 of 2) Never done    COVID-19 Vaccine (5 - Booster for Pfizer series) 10/18/2022    Diabetic microalbuminuria test  12/07/2022    Hepatitis B vaccine (1 of 3 - Risk 3-dose series) 12/19/2023 (Originally 2/7/1984)    Depression Screen  07/28/2023    Lipids  08/18/2023    A1C test (Diabetic or Prediabetic)  11/10/2023    Colorectal Cancer Screen  09/20/2025    Flu vaccine  Completed    Pneumococcal 0-64 years Vaccine  Completed    Hepatitis A vaccine  Aged Out    Hib vaccine  Aged Out    Meningococcal (ACWY) vaccine  Aged Out Hepatitis C screen  Discontinued    HIV screen  Discontinued     Recommendations for Preventive Services Due: see orders and patient instructions/AVS.    Return in about 3 months (around 3/19/2023) for diabetes . Advance Care Planning   Advanced Care Planning: Discussed the patients choices for care and treatment in case of a health event that adversely affects decision-making abilities. Also discussed the patients long-term treatment options. Reviewed with the patient the appropriate state-specific advance directive documents. Reviewed the process of designating a competent adult as an Agent (or -in-fact) that could take make health care decisions for the patient if incompetent. Patient was asked to complete the declaration forms, if they have not already, either acknowledge the forms by a public notary or an eligible witness and provide a signed copy to the practice office. Time spent (minutes): 5       Obesity Counseling: Assessed behavioral health risks and factors affecting choice of behavior. Suggested weight control approaches, including dietary changes behavioral modification and follow up plan. Provided educational and support documentation. Time spent (minutes): 5    Cardiovascular Disease Risk Counseling: Assessed the patient's risk to develop cardiovascular disease and reviewed main risk factors.    Reviewed steps to reduce disease risk including:   Quitting tobacco use, reducing amount smoked, or not starting the habit  Making healthy food choices  Being physically active and gradualy increasing activity levels   Reduce weight and determine a healthy BMI goal  Monitor blood pressure and treat if higher than 140/90 mmHg  Maintain blood total cholesterol levels under 5 mmol/l or 190 mg/dl  Maintain LDL cholesterol levels under 3.0 mmol/l or 115 mg/dl   Control blood glucose levels  Consider taking aspirin (75 mg daily), once blood pressure is controlled   Provided a follow up plan.  Time spent (minutes): 10

## 2023-01-13 RX ORDER — ERGOCALCIFEROL 1.25 MG/1
CAPSULE ORAL
Qty: 12 CAPSULE | Refills: 0 | OUTPATIENT
Start: 2023-01-13

## 2023-01-13 RX ORDER — GLIPIZIDE 10 MG/1
10 TABLET ORAL DAILY
Qty: 90 TABLET | Refills: 0 | Status: SHIPPED | OUTPATIENT
Start: 2023-01-13

## 2023-01-13 RX ORDER — ERGOCALCIFEROL 1.25 MG/1
CAPSULE ORAL
Qty: 12 CAPSULE | Refills: 0 | Status: SHIPPED | OUTPATIENT
Start: 2023-01-13

## 2023-01-31 ENCOUNTER — PATIENT MESSAGE (OUTPATIENT)
Dept: FAMILY MEDICINE CLINIC | Age: 58
End: 2023-01-31

## 2023-01-31 DIAGNOSIS — G60.9 IDIOPATHIC PERIPHERAL NEUROPATHY: ICD-10-CM

## 2023-02-01 RX ORDER — LEUCOVORIN/PYRIDOX/MECOBALAMIN 4-50-2 MG
4-50 TABLET ORAL 2 TIMES DAILY
Qty: 180 TABLET | Refills: 2 | Status: SHIPPED | OUTPATIENT
Start: 2023-02-01

## 2023-02-01 NOTE — TELEPHONE ENCOUNTER
From: Karel Cain. To: Dr. Capps Martinez: 1/31/2023 7:27 PM EST  Subject: Folinic-Plus 4-50-2 MG Tabs    I need a prescription for this medication please  Can I get it for a 90 day supply for insurance  I take it 1 tablet twice a day.   Thank you ,

## 2023-02-13 ENCOUNTER — OFFICE VISIT (OUTPATIENT)
Dept: FAMILY MEDICINE CLINIC | Age: 58
End: 2023-02-13
Payer: COMMERCIAL

## 2023-02-13 VITALS
RESPIRATION RATE: 16 BRPM | DIASTOLIC BLOOD PRESSURE: 64 MMHG | HEART RATE: 82 BPM | WEIGHT: 234.2 LBS | SYSTOLIC BLOOD PRESSURE: 118 MMHG | HEIGHT: 70 IN | BODY MASS INDEX: 33.53 KG/M2 | TEMPERATURE: 97.5 F | OXYGEN SATURATION: 96 %

## 2023-02-13 DIAGNOSIS — E79.0 HYPERURICEMIA: ICD-10-CM

## 2023-02-13 DIAGNOSIS — E78.1 HYPERTRIGLYCERIDEMIA: ICD-10-CM

## 2023-02-13 DIAGNOSIS — K21.00 GASTROESOPHAGEAL REFLUX DISEASE WITH ESOPHAGITIS WITHOUT HEMORRHAGE: ICD-10-CM

## 2023-02-13 DIAGNOSIS — E11.42 DIABETIC POLYNEUROPATHY ASSOCIATED WITH TYPE 2 DIABETES MELLITUS (HCC): Primary | ICD-10-CM

## 2023-02-13 PROCEDURE — G8482 FLU IMMUNIZE ORDER/ADMIN: HCPCS | Performed by: SURGERY

## 2023-02-13 PROCEDURE — 3046F HEMOGLOBIN A1C LEVEL >9.0%: CPT | Performed by: SURGERY

## 2023-02-13 PROCEDURE — 2022F DILAT RTA XM EVC RTNOPTHY: CPT | Performed by: SURGERY

## 2023-02-13 PROCEDURE — 3017F COLORECTAL CA SCREEN DOC REV: CPT | Performed by: SURGERY

## 2023-02-13 PROCEDURE — 1036F TOBACCO NON-USER: CPT | Performed by: SURGERY

## 2023-02-13 PROCEDURE — 99214 OFFICE O/P EST MOD 30 MIN: CPT | Performed by: SURGERY

## 2023-02-13 PROCEDURE — G8427 DOCREV CUR MEDS BY ELIG CLIN: HCPCS | Performed by: SURGERY

## 2023-02-13 PROCEDURE — G8417 CALC BMI ABV UP PARAM F/U: HCPCS | Performed by: SURGERY

## 2023-02-13 RX ORDER — AMITRIPTYLINE HYDROCHLORIDE 50 MG/1
TABLET, FILM COATED ORAL
COMMUNITY
Start: 2023-01-31

## 2023-02-13 RX ORDER — ALLOPURINOL 100 MG/1
TABLET ORAL
COMMUNITY
Start: 2022-12-19

## 2023-02-13 SDOH — ECONOMIC STABILITY: INCOME INSECURITY: HOW HARD IS IT FOR YOU TO PAY FOR THE VERY BASICS LIKE FOOD, HOUSING, MEDICAL CARE, AND HEATING?: NOT HARD AT ALL

## 2023-02-13 SDOH — ECONOMIC STABILITY: FOOD INSECURITY: WITHIN THE PAST 12 MONTHS, THE FOOD YOU BOUGHT JUST DIDN'T LAST AND YOU DIDN'T HAVE MONEY TO GET MORE.: NEVER TRUE

## 2023-02-13 SDOH — ECONOMIC STABILITY: HOUSING INSECURITY
IN THE LAST 12 MONTHS, WAS THERE A TIME WHEN YOU DID NOT HAVE A STEADY PLACE TO SLEEP OR SLEPT IN A SHELTER (INCLUDING NOW)?: NO

## 2023-02-13 SDOH — ECONOMIC STABILITY: FOOD INSECURITY: WITHIN THE PAST 12 MONTHS, YOU WORRIED THAT YOUR FOOD WOULD RUN OUT BEFORE YOU GOT MONEY TO BUY MORE.: NEVER TRUE

## 2023-02-13 ASSESSMENT — PATIENT HEALTH QUESTIONNAIRE - PHQ9
SUM OF ALL RESPONSES TO PHQ QUESTIONS 1-9: 0
SUM OF ALL RESPONSES TO PHQ QUESTIONS 1-9: 0
2. FEELING DOWN, DEPRESSED OR HOPELESS: 0
SUM OF ALL RESPONSES TO PHQ QUESTIONS 1-9: 0
1. LITTLE INTEREST OR PLEASURE IN DOING THINGS: 0
SUM OF ALL RESPONSES TO PHQ9 QUESTIONS 1 & 2: 0
SUM OF ALL RESPONSES TO PHQ QUESTIONS 1-9: 0

## 2023-02-13 NOTE — PATIENT INSTRUCTIONS
GERD    -No food within 3 hours of laying to bed (same goes for medications with the exception of sleep aids that are 1 hour prior to bed)  -No drink within 1 hour of laying to bed  -Elevate the head of the bed by 30 degrees (1/2 heigh cinder blocks work well)   -Keep food diary and know/avoid triggers      -Carbohydrates limit to 40 to 50g per meal (120g to 150g per day)  -Fats limit to 60g per day (total fat intake should be 30% to 35% of your total daily calories, so restrict to whichever number is lower)   -Of the fats you do eat, never eat trans fats, never eat unsaturated fats, limit your saturated fat intake to less than 20g per day (or 7% of your total daily calories, so restrict to whichever number is lower)  -Cholesterol limit to no more than 300mg per day (200mg per day if you have elevated triglycerides or total cholesterol)  -Sodium less than 2000mg per day    MyFitnessPal or similar application (or track by journal) to monitor calories and macronutrients. This is the most critical component to a heart healthy, balanced diet: honesty, keeping oneself accountable, ensure you are tracking daily goals and meeting them. Food is medicine!       If A1c remains above 7.5% then we will need to do a long-acting/basal insulin

## 2023-02-13 NOTE — PROGRESS NOTES
Cynthia Cuadra (:  1965) is a 59 y.o. male,Established patient, here for evaluation of the following chief complaint(s):  Diabetes and Health Maintenance (Due for Diabetic Eye, Tdap)         ASSESSMENT/PLAN:  1. Diabetic polyneuropathy associated with type 2 diabetes mellitus (HCC)  -     CBC with Auto Differential; Future  -     Comprehensive Metabolic Panel; Future  -     Hemoglobin A1C; Future  -     empagliflozin (JARDIANCE) 25 MG tablet; Take 1 tablet by mouth daily, Disp-90 tablet, R-1Normal  2. Hyperuricemia  -     Uric Acid; Future  3. Gastroesophageal reflux disease with esophagitis without hemorrhage  -     CBC with Auto Differential; Future  -     Comprehensive Metabolic Panel; Future  4. Hypertriglyceridemia    See hpi  Return in about 3 months (around 2023) for diabetic check . Subjective   SUBJECTIVE/OBJECTIVE:  HPI:    Diabetic polyneuropathy associated with type 2 diabetes mellitus (HCC)  -jardiance 10mg daily (no nausea, diarrheal illness knows to not take if this occurs, no perineal symptoms)  -glipizide 10mg daily with breakfast (no hypoglycemic episodes)  -metformin 1000mg with meals bid (no gi symptoms)  -ozempic 1mg weekly (no nausea, epigastric pain)     -fasting BG is 180s but does not routinely check  . .. he is going to put in a log from now on.     -eye exams with: \"[facility] in [place]\" - his wife schedules apts   -Dr. Neale Runner following with podiatry      -A1c last visit: 8.1% (not due today)     Average fasting lois:       168  Average fastin  [A1c avg 8 to 8.5%]  Average zenith:                 238     -no changes this visit, patient will try his best to control diet    Will not make medication changes at this time  If by next visit A1c is not at goal will remove the glipizide, install lantus with titration  Diet counseling today  Cannot get A1c in office due to lab error    GERD  -EGD in past of  due to globus sensation and reflux (had stretching done and did not have rec to come back)  -no further issue of globus sensation  -omeprazole 20mg once daily   -no untoward effects  -stable, no change to dose above given hx of hiatal hernia on EGD      -No food within 3 hours of laying to bed (same goes for medications with the exception of sleep aids that are 1 hour prior to bed)  -No drink within 1 hour of laying to bed  -Elevate the head of the bed by 30 degrees (1/2 heigh cinder blocks work well)   -Keep food diary and know/avoid triggers        HLD / hypertriglyceridemia  Triglyceride, Fasting 0 - 149 mg/dL 341 High     -finally agreed to vascepa, working on carb control  -will recheck at 6 month rosemary (august) of steady therapy  -no change to vascepa 2gm BID      Preventative:  Health Maintenance   Topic Date Due    Diabetic foot exam  Never done    Diabetic retinal exam  Never done    DTaP/Tdap/Td vaccine (1 - Tdap) Never done    Diabetic Alb to Cr ratio (uACR) test  12/07/2022    Shingles vaccine (2 of 2) 03/10/2023    Depression Screen  07/28/2023    Lipids  08/18/2023    GFR test (Diabetes, CKD 3-4, OR last GFR 15-59)  08/18/2023    A1C test (Diabetic or Prediabetic)  11/10/2023    Colorectal Cancer Screen  09/20/2025    Flu vaccine  Completed    Pneumococcal 0-64 years Vaccine  Completed    COVID-19 Vaccine  Completed    Hepatitis A vaccine  Aged Out    Hib vaccine  Aged Out    Meningococcal (ACWY) vaccine  Aged Out    Hepatitis C screen  Discontinued    HIV screen  Discontinued           ROS:    Denies 10pt ROS other than noted in HPI. Objective       PHYSICAL EXAM:    /64   Pulse 82   Temp 97.5 °F (36.4 °C) (Temporal)   Resp 16   Ht 5' 10\" (1.778 m)   Wt 234 lb 3.2 oz (106.2 kg)   SpO2 96%   BMI 33.60 kg/m²     AVSS    GA: Well-groomed, appears well, no acute distress. HEENT: Atraumatic normocephalic. Extraocular muscles are grossly intact. Pupils are equal round reactive to light.   Conjunctiva pink and moist.  Hearing is grossly intact. Nares patent without external drainage. Buccal mucosa pink and moist.  Posterior oropharynx clear without lesion or exudate. NECK: Trachea is midline, supple, nontender, no lymphadenopathy. CARDIO: Regular rate and rhythm without murmur rub or gallop. RESPIRATORY: Clear to auscultation bilaterally without wheezes rales or rhonchi. Normal inspiratory and expiratory effort. Normoxic on room air    ABD: Rounded, normoactive bowel sounds. MSK: Structurally appropriate for age. No gross deficit. NEURO: Alert, no gross deficit. PSYCH:  Mood is normal and congruent with affect. No signs of psychomotor retardation or agitation. Thought content seems normal, speech is fluent and non-pressured. SKIN: Generally warm pink and dry. An electronic signature was used to authenticate this note.     --Gely Mancia, DO

## 2023-02-18 RX ORDER — OMEPRAZOLE 20 MG/1
CAPSULE, DELAYED RELEASE ORAL
Qty: 30 CAPSULE | Refills: 5 | Status: CANCELLED | OUTPATIENT
Start: 2023-02-18

## 2023-02-20 RX ORDER — OMEPRAZOLE 20 MG/1
CAPSULE, DELAYED RELEASE ORAL
Qty: 30 CAPSULE | Refills: 5 | Status: SHIPPED | OUTPATIENT
Start: 2023-02-20

## 2023-02-20 NOTE — TELEPHONE ENCOUNTER
Medication refill request    Last Appointment   7/28/2022  Next Appointment  Visit date not found

## 2023-04-04 RX ORDER — GLIPIZIDE 10 MG/1
10 TABLET ORAL DAILY
Qty: 90 TABLET | Refills: 0 | Status: SHIPPED | OUTPATIENT
Start: 2023-04-04

## 2023-04-04 RX ORDER — ERGOCALCIFEROL 1.25 MG/1
CAPSULE ORAL
Qty: 12 CAPSULE | Refills: 0 | Status: SHIPPED | OUTPATIENT
Start: 2023-04-04

## 2023-04-24 ENCOUNTER — PATIENT MESSAGE (OUTPATIENT)
Dept: FAMILY MEDICINE CLINIC | Age: 58
End: 2023-04-24

## 2023-04-25 RX ORDER — ATORVASTATIN CALCIUM 10 MG/1
10 TABLET, FILM COATED ORAL DAILY
Qty: 90 TABLET | Refills: 0 | Status: SHIPPED | OUTPATIENT
Start: 2023-04-25

## 2023-04-25 NOTE — TELEPHONE ENCOUNTER
From: Beatriz Mcdonald. To: Dr. Null Demark: 4/24/2023 7:04 PM EDT  Subject: Prescription Needed    Hello, I need a prescription sent in for my atorvastatin 10 MG tablet. I need them in 90 supply for insurance please.   Thank you

## 2023-06-27 DIAGNOSIS — E11.42 DIABETIC POLYNEUROPATHY ASSOCIATED WITH TYPE 2 DIABETES MELLITUS (HCC): ICD-10-CM

## 2023-06-28 RX ORDER — EMPAGLIFLOZIN 25 MG/1
TABLET, FILM COATED ORAL
Qty: 90 TABLET | Refills: 1 | Status: SHIPPED | OUTPATIENT
Start: 2023-06-28

## 2023-07-12 RX ORDER — GLIPIZIDE 10 MG/1
10 TABLET ORAL DAILY
Qty: 90 TABLET | Refills: 0 | Status: SHIPPED | OUTPATIENT
Start: 2023-07-12

## 2023-07-12 RX ORDER — ERGOCALCIFEROL 1.25 MG/1
CAPSULE ORAL
Qty: 12 CAPSULE | Refills: 0 | Status: SHIPPED | OUTPATIENT
Start: 2023-07-12

## 2023-07-12 NOTE — TELEPHONE ENCOUNTER
Last Appointment   2/13/2023  Next Appointment  Visit date not found Cimetidine Counseling:  I discussed with the patient the risks of Cimetidine including but not limited to gynecomastia, headache, diarrhea, nausea, drowsiness, arrhythmias, pancreatitis, skin rashes, psychosis, bone marrow suppression and kidney toxicity.

## 2023-07-24 DIAGNOSIS — E78.1 HYPERTRIGLYCERIDEMIA: ICD-10-CM

## 2023-07-24 RX ORDER — ICOSAPENT ETHYL 1000 MG/1
2 CAPSULE ORAL 2 TIMES DAILY
Qty: 360 CAPSULE | Refills: 2 | Status: SHIPPED | OUTPATIENT
Start: 2023-07-24

## 2023-07-24 RX ORDER — ATORVASTATIN CALCIUM 10 MG/1
10 TABLET, FILM COATED ORAL DAILY
Qty: 90 TABLET | Refills: 0 | Status: SHIPPED | OUTPATIENT
Start: 2023-07-24

## 2023-08-01 DIAGNOSIS — M10.9 ACUTE GOUT OF LEFT HAND, UNSPECIFIED CAUSE: ICD-10-CM

## 2023-08-01 RX ORDER — ALLOPURINOL 100 MG/1
TABLET ORAL
Qty: 180 TABLET | Refills: 0 | Status: SHIPPED | OUTPATIENT
Start: 2023-08-01

## 2023-08-28 ENCOUNTER — TELEPHONE (OUTPATIENT)
Dept: FAMILY MEDICINE CLINIC | Age: 58
End: 2023-08-28

## 2023-08-30 RX ORDER — OMEPRAZOLE 20 MG/1
20 CAPSULE, DELAYED RELEASE ORAL
Qty: 90 CAPSULE | Refills: 3 | Status: SHIPPED | OUTPATIENT
Start: 2023-08-30

## 2023-10-04 RX ORDER — HYDROCHLOROTHIAZIDE 25 MG/1
25 TABLET ORAL DAILY
Qty: 90 TABLET | Refills: 3 | Status: SHIPPED | OUTPATIENT
Start: 2023-10-04

## 2023-10-04 RX ORDER — ATENOLOL 100 MG/1
100 TABLET ORAL DAILY
Qty: 90 TABLET | Refills: 3 | Status: SHIPPED | OUTPATIENT
Start: 2023-10-04

## 2023-10-04 RX ORDER — AMLODIPINE BESYLATE 10 MG/1
TABLET ORAL
Qty: 90 TABLET | Refills: 3 | Status: SHIPPED | OUTPATIENT
Start: 2023-10-04

## 2023-10-04 RX ORDER — BENAZEPRIL HYDROCHLORIDE 40 MG/1
40 TABLET, FILM COATED ORAL DAILY
Qty: 90 TABLET | Refills: 3 | Status: SHIPPED | OUTPATIENT
Start: 2023-10-04

## 2023-10-11 DIAGNOSIS — E55.9 VITAMIN D DEFICIENCY: Primary | ICD-10-CM

## 2023-10-11 RX ORDER — ERGOCALCIFEROL 1.25 MG/1
CAPSULE ORAL
Qty: 12 CAPSULE | Refills: 1 | Status: SHIPPED | OUTPATIENT
Start: 2023-10-11

## 2023-10-12 RX ORDER — GLIPIZIDE 10 MG/1
10 TABLET ORAL DAILY
Qty: 90 TABLET | Refills: 3 | Status: SHIPPED | OUTPATIENT
Start: 2023-10-12

## 2023-10-25 DIAGNOSIS — M10.9 ACUTE GOUT OF LEFT HAND, UNSPECIFIED CAUSE: ICD-10-CM

## 2023-10-25 RX ORDER — ALLOPURINOL 100 MG/1
TABLET ORAL
Qty: 180 TABLET | Refills: 3 | Status: SHIPPED | OUTPATIENT
Start: 2023-10-25

## 2023-12-06 DIAGNOSIS — E11.42 DIABETIC POLYNEUROPATHY ASSOCIATED WITH TYPE 2 DIABETES MELLITUS (HCC): ICD-10-CM

## 2023-12-06 RX ORDER — SEMAGLUTIDE 2.68 MG/ML
INJECTION, SOLUTION SUBCUTANEOUS
Qty: 15 ML | Refills: 3 | Status: SHIPPED | OUTPATIENT
Start: 2023-12-06

## 2023-12-27 DIAGNOSIS — G62.9 NEUROPATHY: Primary | ICD-10-CM

## 2023-12-27 RX ORDER — AMITRIPTYLINE HYDROCHLORIDE 50 MG/1
50 TABLET, FILM COATED ORAL NIGHTLY
COMMUNITY
Start: 2023-10-20 | End: 2023-12-27 | Stop reason: SDUPTHER

## 2023-12-28 RX ORDER — AMITRIPTYLINE HYDROCHLORIDE 50 MG/1
50 TABLET, FILM COATED ORAL NIGHTLY
Qty: 90 TABLET | Refills: 3 | Status: SHIPPED | OUTPATIENT
Start: 2023-12-28

## 2024-02-13 ENCOUNTER — TELEPHONE (OUTPATIENT)
Dept: FAMILY MEDICINE CLINIC | Age: 59
End: 2024-02-13

## 2024-02-13 DIAGNOSIS — E78.5 TYPE 2 DIABETES MELLITUS WITH HYPERLIPIDEMIA (HCC): ICD-10-CM

## 2024-02-13 DIAGNOSIS — E11.69 TYPE 2 DIABETES MELLITUS WITH HYPERLIPIDEMIA (HCC): ICD-10-CM

## 2024-02-13 DIAGNOSIS — E78.5 HYPERLIPIDEMIA, UNSPECIFIED HYPERLIPIDEMIA TYPE: Primary | ICD-10-CM

## 2024-02-13 NOTE — TELEPHONE ENCOUNTER
Insurance wants him on high intensity statin therapy, they will not cover the vascepa any longer.    Atorvastatin 40mg (please rommel up if he is willing to switch to this in order to try and control his triglycerides).       Left detailed message for patient to call back to notify

## 2024-02-14 DIAGNOSIS — E78.5 HYPERLIPIDEMIA, UNSPECIFIED HYPERLIPIDEMIA TYPE: ICD-10-CM

## 2024-02-14 DIAGNOSIS — E78.5 TYPE 2 DIABETES MELLITUS WITH HYPERLIPIDEMIA (HCC): ICD-10-CM

## 2024-02-14 DIAGNOSIS — E11.69 TYPE 2 DIABETES MELLITUS WITH HYPERLIPIDEMIA (HCC): ICD-10-CM

## 2024-02-14 RX ORDER — ATORVASTATIN CALCIUM 40 MG/1
40 TABLET, FILM COATED ORAL DAILY
Qty: 90 TABLET | OUTPATIENT
Start: 2024-02-14

## 2024-02-14 RX ORDER — ATORVASTATIN CALCIUM 40 MG/1
40 TABLET, FILM COATED ORAL DAILY
Qty: 30 TABLET | Refills: 2 | Status: SHIPPED | OUTPATIENT
Start: 2024-02-14

## 2024-02-14 NOTE — TELEPHONE ENCOUNTER
Please advise. Pt called in and confirmed he is willing to try the atorvastatin 40mg, pt currently living in [facility] and has an appointment scheduled to establish. Updated pharmacy for temporary supply.

## 2024-03-14 RX ORDER — ERGOCALCIFEROL 1.25 MG/1
CAPSULE ORAL
Qty: 12 CAPSULE | Refills: 0 | Status: SHIPPED | OUTPATIENT
Start: 2024-03-14

## 2024-03-15 NOTE — TELEPHONE ENCOUNTER
Per Dr Holman: Let patient know his new PCP should check a vit d level sooner than later.     Pt informed.

## 2024-03-26 RX ORDER — ATENOLOL 100 MG/1
100 TABLET ORAL DAILY
Qty: 90 TABLET | Refills: 3 | OUTPATIENT
Start: 2024-03-26

## 2024-03-26 RX ORDER — AMLODIPINE BESYLATE 10 MG/1
TABLET ORAL
Qty: 90 TABLET | Refills: 3 | OUTPATIENT
Start: 2024-03-26

## 2024-03-26 RX ORDER — BENAZEPRIL HYDROCHLORIDE 40 MG/1
40 TABLET ORAL DAILY
Qty: 90 TABLET | Refills: 3 | OUTPATIENT
Start: 2024-03-26

## 2024-03-26 RX ORDER — HYDROCHLOROTHIAZIDE 25 MG/1
25 TABLET ORAL DAILY
Qty: 90 TABLET | Refills: 3 | OUTPATIENT
Start: 2024-03-26

## 2024-04-04 RX ORDER — GLIPIZIDE 10 MG/1
10 TABLET ORAL DAILY
Qty: 90 TABLET | Refills: 3 | OUTPATIENT
Start: 2024-04-04

## 2024-04-22 RX ORDER — ATORVASTATIN CALCIUM 10 MG/1
10 TABLET, FILM COATED ORAL DAILY
Qty: 90 TABLET | Refills: 3 | OUTPATIENT
Start: 2024-04-22

## 2024-04-27 DIAGNOSIS — M10.9 ACUTE GOUT OF LEFT HAND, UNSPECIFIED CAUSE: ICD-10-CM

## 2024-04-29 RX ORDER — ALLOPURINOL 100 MG/1
TABLET ORAL
Qty: 180 TABLET | Refills: 3 | OUTPATIENT
Start: 2024-04-29